# Patient Record
Sex: FEMALE | Race: WHITE | Employment: FULL TIME | URBAN - METROPOLITAN AREA
[De-identification: names, ages, dates, MRNs, and addresses within clinical notes are randomized per-mention and may not be internally consistent; named-entity substitution may affect disease eponyms.]

---

## 2017-04-06 PROBLEM — N83.202 CYST OF LEFT OVARY: Status: ACTIVE | Noted: 2017-04-06

## 2017-06-30 PROBLEM — G93.0 CHOROID PLEXUS CYST: Status: ACTIVE | Noted: 2017-06-30

## 2017-06-30 PROBLEM — O43.109 ABNORMAL PLACENTA: Status: ACTIVE | Noted: 2017-06-30

## 2017-09-21 PROBLEM — O36.60X0 LGA (LARGE FOR GESTATIONAL AGE) FETUS AFFECTING MANAGEMENT OF MOTHER: Status: ACTIVE | Noted: 2017-09-21

## 2017-09-25 PROBLEM — D50.9 IRON DEFICIENCY ANEMIA: Status: ACTIVE | Noted: 2017-09-25

## 2017-09-26 PROBLEM — R82.71 GBS BACTERIURIA: Status: ACTIVE | Noted: 2017-09-26

## 2017-10-06 PROBLEM — O40.9XX0 POLYHYDRAMNIOS: Status: ACTIVE | Noted: 2017-10-06

## 2017-10-11 PROBLEM — O24.410 WHITE CLASSIFICATION A1 GESTATIONAL DIABETES MELLITUS: Status: ACTIVE | Noted: 2017-10-11

## 2017-11-10 ENCOUNTER — HOSPITAL ENCOUNTER (INPATIENT)
Age: 22
LOS: 3 days | Discharge: HOME | End: 2017-11-14
Attending: OBSTETRICS & GYNECOLOGY | Admitting: OBSTETRICS & GYNECOLOGY
Payer: SELF-PAY

## 2017-11-10 LAB
ERYTHROCYTE [DISTWIDTH] IN BLOOD BY AUTOMATED COUNT: 13.6 % (ref 11.9–14.6)
GLUCOSE BLD STRIP.AUTO-MCNC: 86 MG/DL (ref 65–100)
HCT VFR BLD AUTO: 29.5 % (ref 35.8–46.3)
HGB BLD-MCNC: 9.7 G/DL (ref 11.7–15.4)
MCH RBC QN AUTO: 25.7 PG (ref 26.1–32.9)
MCHC RBC AUTO-ENTMCNC: 32.9 G/DL (ref 31.4–35)
MCV RBC AUTO: 78.2 FL (ref 79.6–97.8)
PLATELET # BLD AUTO: 226 K/UL (ref 150–450)
PMV BLD AUTO: 9.9 FL (ref 10.8–14.1)
RBC # BLD AUTO: 3.77 M/UL (ref 4.05–5.25)
WBC # BLD AUTO: 8.9 K/UL (ref 4.3–11.1)

## 2017-11-10 PROCEDURE — 74011250637 HC RX REV CODE- 250/637: Performed by: OBSTETRICS & GYNECOLOGY

## 2017-11-10 PROCEDURE — 82962 GLUCOSE BLOOD TEST: CPT

## 2017-11-10 PROCEDURE — 65270000029 HC RM PRIVATE

## 2017-11-10 PROCEDURE — 86900 BLOOD TYPING SEROLOGIC ABO: CPT | Performed by: OBSTETRICS & GYNECOLOGY

## 2017-11-10 PROCEDURE — 85027 COMPLETE CBC AUTOMATED: CPT | Performed by: OBSTETRICS & GYNECOLOGY

## 2017-11-10 RX ORDER — DEXTROSE, SODIUM CHLORIDE, SODIUM LACTATE, POTASSIUM CHLORIDE, AND CALCIUM CHLORIDE 5; .6; .31; .03; .02 G/100ML; G/100ML; G/100ML; G/100ML; G/100ML
250 INJECTION, SOLUTION INTRAVENOUS CONTINUOUS
Status: DISCONTINUED | OUTPATIENT
Start: 2017-11-11 | End: 2017-11-13

## 2017-11-10 RX ORDER — LIDOCAINE HYDROCHLORIDE 10 MG/ML
1 INJECTION INFILTRATION; PERINEURAL
Status: DISCONTINUED | OUTPATIENT
Start: 2017-11-10 | End: 2017-11-12 | Stop reason: HOSPADM

## 2017-11-10 RX ORDER — MINERAL OIL
120 OIL (ML) ORAL
Status: COMPLETED | OUTPATIENT
Start: 2017-11-10 | End: 2017-11-12

## 2017-11-10 RX ORDER — BUTORPHANOL TARTRATE 1 MG/ML
1 INJECTION INTRAMUSCULAR; INTRAVENOUS
Status: DISCONTINUED | OUTPATIENT
Start: 2017-11-10 | End: 2017-11-12 | Stop reason: HOSPADM

## 2017-11-10 RX ORDER — DEXTROSE MONOHYDRATE, SODIUM CHLORIDE, SODIUM LACTATE, POTASSIUM CHLORIDE, CALCIUM CHLORIDE 5; 600; 310; 179; 20 G/100ML; MG/100ML; MG/100ML; MG/100ML; MG/100ML
INJECTION, SOLUTION INTRAVENOUS CONTINUOUS
Status: DISCONTINUED | OUTPATIENT
Start: 2017-11-11 | End: 2017-11-10

## 2017-11-10 RX ORDER — ZOLPIDEM TARTRATE 5 MG/1
5 TABLET ORAL
Status: DISCONTINUED | OUTPATIENT
Start: 2017-11-10 | End: 2017-11-13

## 2017-11-10 RX ORDER — LIDOCAINE HYDROCHLORIDE 20 MG/ML
JELLY TOPICAL
Status: DISCONTINUED | OUTPATIENT
Start: 2017-11-10 | End: 2017-11-12 | Stop reason: HOSPADM

## 2017-11-10 RX ORDER — OXYTOCIN/RINGER'S LACTATE 30/500 ML
1-30 PLASTIC BAG, INJECTION (ML) INTRAVENOUS
Status: DISCONTINUED | OUTPATIENT
Start: 2017-11-11 | End: 2017-11-13

## 2017-11-10 RX ADMIN — ZOLPIDEM TARTRATE 5 MG: 5 TABLET ORAL at 23:25

## 2017-11-10 NOTE — IP AVS SNAPSHOT
Rubio Traylor 
 
 
 [address] [facility] [address] 
[phone] Patient: Nicolette Underwood MRN: [id number] :[id number] About your hospitalization You were admitted on:  November 10, 2017 You last received care in the:  [address] You were discharged on:  2017 Why you were hospitalized Your primary diagnosis was:  Not on File Your diagnoses also included:  Lga (Large For Gestational Age) Fetus Affecting Management Of Mother, Third Trimester, Fetus 1, Polyhydramnios Affecting Pregnancy In Third Trimester, Normal Labor Things You Need To Do (next 8 weeks) Follow up with Christos Singh MD  
  
Phone:  [phone] Where:  [address] [facility] [address] Follow up with Abena Daugherty MD in 2 week(s) As needed and/or, If symptoms worsen Phone:  [phone] Where:  [address] [facility] [address] Tuesday Dec 26, 2017 POSTPARTUM VISIT with Raina Raymond MD at  9:30 AM  
Where:  [facility] ([facility]) Discharge Orders None A check dontae indicates which time of day the medication should be taken. My Medications STOP taking these medications   
 penicillin v potassium 500 mg tablet Commonly known as:  VEETID  
   
  
  
TAKE these medications as instructed Instructions Each Dose to Equal  
 Morning Noon Evening Bedtime  
 ascorbic acid (vitamin C) 500 mg tablet Commonly known as:  VITAMIN C Your last dose was: Your next dose is: Take 1 Tab by mouth two (2) times a day. 500 mg  
    
   
   
   
  
 doxylamine succinate 25 mg tablet Commonly known as:  Eligha Bring Your last dose was: Your next dose is: Take 25 mg by mouth nightly as needed for Sleep. 25 mg  
    
   
   
   
  
 ferrous sulfate 325 mg (65 mg iron) tablet Commonly known as:  Iron (Ferrous Sulfate) Your last dose was: Your next dose is: Take 1 Tab by mouth two (2) times a day. 325 mg  
    
   
   
   
  
 ibuprofen 800 mg tablet Commonly known as:  MOTRIN Your last dose was: Your next dose is: Take 1 Tab by mouth every eight (8) hours as needed for Pain. 800 mg  
    
   
   
   
  
 oxyCODONE-acetaminophen 7.5-325 mg per tablet Commonly known as:  PERCOCET 7.5 Your last dose was: Your next dose is: Take  1 or 2 tablets every 4 hours as needed for pain. Do not exceed 8 tablets in 24 hours PNV 67-iron ps-folate no. 1-dha 29 mg iron- 1 mg-200 mg Cap Commonly known as:  Dirk Gills Your last dose was: Your next dose is: Take 1 Cap by mouth daily. 1 Cap VITAMIN B-6 100 mg tablet Generic drug:  pyridoxine (vitamin B6) Your last dose was: Your next dose is: Take 100 mg by mouth daily. 100 mg Where to Get Your Medications These medications were sent to [facility] [address], [address] [place] [address] 495, [address] [facility] [[email or web address] Phone:  [phone]  
  ibuprofen 800 mg tablet Information on where to get these meds will be given to you by the nurse or doctor. ! Ask your nurse or doctor about these medications  
  oxyCODONE-acetaminophen 7.5-325 mg per tablet Discharge Instructions Discharge instruction to follow: Activity: Pelvis rest for 6 weeks No heavy lifting over 15 lbs for 2 weeks No driving for 2 weeks No push/pull motion such as sweeping or vacuuming for 2 weeks No tub baths for 6 weeks  section keep incision clean and dry, may shower as normal with soap and water. Inspect incision every day for signs of infection listed below. Continue to use ranjeet-bottle with every void or bowel movement until comfortable stopping. Change sanitary pad after each urination or bowel movement. Call MD for the following: 
    Fever over 101 F; pain not relieved by medication; foul smelling vaginal discharge or increase in vaginal bleeding. Redness, swelling, or drainage from  incision. Take medication as prescribed. Follow up with MD as order. After Your Delivery (the Postpartum Period): Care Instructions Your Care Instructions Congratulations on the birth of your baby. Like pregnancy, the  period can be a time of excitement, terrie, and exhaustion. You may look at your wondrous little baby and feel happy. You may also be overwhelmed by your new sleep hours and new responsibilities. At first, babies often sleep during the days and are awake at night. They do not have a pattern or routine. They may make sudden gasps, jerk themselves awake, or look like they have crossed eyes. These are all normal, and they may even make you smile. In these first weeks after delivery, try to take good care of yourself. It may take 4 to 6 weeks to feel like yourself again, and possibly longer if you had a  birth. You will likely feel very tired for several weeks. Your days will be full of ups and downs, but lots of terrie as well. Follow-up care is a key part of your treatment and safety. Be sure to make and go to all appointments, and call your doctor if you are having problems. It's also a good idea to know your test results and keep a list of the medicines you take. How can you care for yourself at home? Take care of your body after delivery · Use pads instead of tampons for the bloody flow that may last as long as 2 weeks. · Ease cramps with ibuprofen (Advil, Motrin). · Ease soreness of hemorrhoids and the area between your vagina and rectum with ice compresses or witch hazel pads. · Ease constipation by drinking lots of fluid and eating high-fiber foods. Ask your doctor about over-the-counter stool softeners. · Cleanse yourself with a gentle squeeze of warm water from a bottle instead of wiping with toilet paper. · Take a sitz bath in warm water several times a day. · Wear a good nursing bra. Ease sore and swollen breasts with warm, wet washcloths. · If you are not breastfeeding, use ice rather than heat for breast soreness. · Your period may not start for several months if you are breastfeeding. You may bleed more, and longer at first, than you did before you got pregnant. · Wait until you are healed (about 4 to 6 weeks) before you have sexual intercourse. Your doctor will tell you when it is okay to have sex. · Try not to travel with your baby for 5 or 6 weeks. If you take a long car trip, make frequent stops to walk around and stretch. Avoid exhaustion · Rest every day. Try to nap when your baby naps. · Ask another adult to be with you for a few days after delivery. · Plan for  if you have other children. · Stay flexible so you can eat at odd hours and sleep when you need to. Both you and your baby are making new schedules. · Plan small trips to get out of the house. Change can make you feel less tired. · Ask for help with housework, cooking, and shopping. Remind yourself that your job is to care for your baby. Know about help for postpartum depression · \"Baby blues\" are common for the first 1 to 2 weeks after birth. You may cry or feel sad or irritable for no reason. · Rest whenever you can. Being tired makes it harder to handle your emotions. · Go for walks with your baby. · Talk to your partner, friends, and family about your feelings. · If your symptoms last for more than a few weeks, or if you feel very depressed, ask your doctor for help. · Postpartum depression can be treated. Support groups and counseling can help. Sometimes medicine can also help. Stay healthy · Eat healthy foods so you have more energy, make good breast milk, and lose extra baby pounds. · If you breastfeed, avoid alcohol and drugs. Stay smoke-free. If you quit during pregnancy, congratulations. · Start daily exercise after 4 to 6 weeks, but rest when you feel tired. · Learn exercises to tone your belly. Do Kegel exercises to regain strength in your pelvic muscles. You can do these exercises while you stand or sit. ¨ Squeeze the same muscles you would use to stop your urine. Your belly and thighs should not move. ¨ Hold the squeeze for 3 seconds, and then relax for 3 seconds. ¨ Start with 3 seconds. Then add 1 second each week until you are able to squeeze for 10 seconds. ¨ Repeat the exercise 10 to 15 times for each session. Do three or more sessions each day. · Find a class for new mothers and new babies that has an exercise time. · If you had a  birth, give yourself a bit more time before you exercise, and be careful. When should you call for help? Call 911 anytime you think you may need emergency care. For example, call if: 
? · You passed out (lost consciousness). ?Call your doctor now or seek immediate medical care if: 
? · You have severe vaginal bleeding. This means you are passing blood clots and soaking through a pad each hour for 2 or more hours. ? · You are dizzy or lightheaded, or you feel like you may faint. ? · You have a fever. ? · You have new belly pain, or your pain gets worse. ? Watch closely for changes in your health, and be sure to contact your doctor if: 
? · Your vaginal bleeding seems to be getting heavier. ? · You have new or worse vaginal discharge. ? · You feel sad, anxious, or hopeless for more than a few days. ? · You do not get better as expected. Where can you learn more? Go to http://david-angela.info/.  
Enter Z636 in the search box to learn more about \"After Your Delivery (the Postpartum Period): Care Instructions. \" Current as of: 2017 Content Version: 11.4 © 0221-5089 [employer]. Care instructions adapted under license by [employer] (which disclaims liability or warranty for this information). If you have questions about a medical condition or this instruction, always ask your healthcare professional. [address] any warranty or liability for your use of this information.  Section: What to Expect at [facility] Your Recovery A  section, or , is surgery to deliver your baby through a cut, called an incision, that the doctor makes in your lower belly and uterus. You may have some pain in your lower belly and need pain medicine for 1 to 2 weeks. You can expect some vaginal bleeding for several weeks. You will probably need about 6 weeks to fully recover. It is important to take it easy while the incision is healing. Avoid heavy lifting, strenuous activities, or exercises that strain the belly muscles while you are recovering. Ask a family member or friend for help with housework, cooking, and shopping. This care sheet gives you a general idea about how long it will take for you to recover. But each person recovers at a different pace. Follow the steps below to get better as quickly as possible. How can you care for yourself at home? Activity ? · Rest when you feel tired. Getting enough sleep will help you recover. ? · Try to walk each day. Start by walking a little more than you did the day before. Bit by bit, increase the amount you walk. Walking boosts blood flow and helps prevent pneumonia, constipation, and blood clots. ? · Avoid strenuous activities, such as bicycle riding, jogging, weightlifting, and aerobic exercise, for 6 weeks or until your doctor says it is okay. ? · Until your doctor says it is okay, do not lift anything heavier than your baby. ? · Do not do sit-ups or other exercises that strain the belly muscles for 6 weeks or until your doctor says it is okay. ? · Hold a pillow over your incision when you cough or take deep breaths. This will support your belly and decrease your pain. ? · You may shower as usual. Pat the incision dry when you are done. ? · You will have some vaginal bleeding. Wear sanitary pads. Do not douche or use tampons until your doctor says it is okay. ? · Ask your doctor when you can drive again. ? · You will probably need to take at least 6 weeks off work. It depends on the type of work you do and how you feel. ? · Ask your doctor when it is okay for you to have sex. Diet ? · You can eat your normal diet. If your stomach is upset, try bland, low-fat foods like plain rice, broiled chicken, toast, and yogurt. ? · Drink plenty of fluids (unless your doctor tells you not to). ? · You may notice that your bowel movements are not regular right after your surgery. This is common. Try to avoid constipation and straining with bowel movements. You may want to take a fiber supplement every day. If you have not had a bowel movement after a couple of days, ask your doctor about taking a mild laxative. ? · If you are breastfeeding, do not drink any alcohol. Medicines ? · Your doctor will tell you if and when you can restart your medicines. He or she will also give you instructions about taking any new medicines. ? · If you take blood thinners, such as warfarin (Coumadin), clopidogrel (Plavix), or aspirin, be sure to talk to your doctor. He or she will tell you if and when to start taking those medicines again. Make sure that you understand exactly what your doctor wants you to do. ? · Take pain medicines exactly as directed. ¨ If the doctor gave you a prescription medicine for pain, take it as prescribed.  
¨ If you are not taking a prescription pain medicine, ask your doctor if you can take an over-the-counter medicine. ? · If you think your pain medicine is making you sick to your stomach: 
¨ Take your medicine after meals (unless your doctor has told you not to). ¨ Ask your doctor for a different pain medicine. ? · If your doctor prescribed antibiotics, take them as directed. Do not stop taking them just because you feel better. You need to take the full course of antibiotics. Incision care ? · If you have strips of tape on the incision, leave the tape on for a week or until it falls off.  
? · Wash the area daily with warm, soapy water, and pat it dry. Don't use hydrogen peroxide or alcohol, which can slow healing. You may cover the area with a gauze bandage if it weeps or rubs against clothing. Change the bandage every day. ? · Keep the area clean and dry. Other instructions ? · If you breastfeed your baby, you may be more comfortable while you are healing if you place the baby so that he or she is not resting on your belly. Try tucking your baby under your arm, with his or her body along the side you will be feeding on. Support your baby's upper body with your arm. With that hand you can control your baby's head to bring his or her mouth to your breast. This is sometimes called the football hold. Follow-up care is a key part of your treatment and safety. Be sure to make and go to all appointments, and call your doctor if you are having problems. It's also a good idea to know your test results and keep a list of the medicines you take. When should you call for help? Call 911 anytime you think you may need emergency care. For example, call if: 
? · You passed out (lost consciousness). ? · You have chest pain, are short of breath, or cough up blood. ?Call your doctor now or seek immediate medical care if: 
? · You have pain that does not get better after you take pain medicine. ? · You have severe vaginal bleeding. ? · You are dizzy or lightheaded, or you feel like you may faint. ? · You have new or worse pain in your belly or pelvis. ? · You have loose stitches, or your incision comes open. ? · You have symptoms of infection, such as: 
¨ Increased pain, swelling, warmth, or redness. ¨ Red streaks leading from the incision. ¨ Pus draining from the incision. ¨ A fever. ? · You have symptoms of a blood clot in your leg (called a deep vein thrombosis), such as: 
¨ Pain in your calf, back of the knee, thigh, or groin. ¨ Redness and swelling in your leg or groin. ? Watch closely for changes in your health, and be sure to contact your doctor if: 
? · You do not get better as expected. Where can you learn more? Go to http://david-angela.info/. Enter M806 in the search box to learn more about \" Section: What to Expect at Home. \" Current as of: 2017 Content Version: 11.4 © 8276-3851 [employer]. Care instructions adapted under license by [employer] (which disclaims liability or warranty for this information). If you have questions about a medical condition or this instruction, always ask your healthcare professional. [address] any warranty or liability for your use of this information. Cuedd Announcement We are excited to announce that we are making your provider's discharge notes available to you in Cuedd. You will see these notes when they are completed and signed by the physician that discharged you from your recent hospital stay. If you have any questions or concerns about any information you see in Cuedd, please call the Health Information Department where you were seen or reach out to your Primary Care Provider for more information about your plan of care. Introducing hospitals & HEALTH SERVICES! Dear Jarrod Merritt: 
Thank you for requesting a Cuedd account.   Our records indicate that you already have an active Twist account. You can access your account anytime at [email or web address] Did you know that you can access your hospital and ER discharge instructions at any time in Twist? You can also review all of your test results from your hospital stay or ER visit. Additional Information If you have questions, please visit the Frequently Asked Questions section of the Twist website at [email or web address] Remember, Twist is NOT to be used for urgent needs. For medical emergencies, dial 911. Now available from your iPhone and Android! Providers Seen During Your Hospitalization Provider Specialty Primary office phone Reji Mcgowan MD Obstetrics & Gynecology [phone] Joeline Siemens, MD Obstetrics & Gynecology [phone] Your Primary Care Physician (PCP) Primary Care Physician Office Phone Office Fax Cher Conley [phone] You are allergic to the following Allergen Reactions Augmentin (Amoxicillin-Pot Clavulanate) Hives Pt has tolerated PCN G IV without reactions Cefzil (Cefprozil) Hives Recent Documentation Height Breastfeeding? OB Status Smoking Status 1.549 m Yes Recent pregnancy Never Smoker Emergency Contacts Name Discharge Info Relation Home Work Mobile Una Sadler  Mother [14] [phone] Brock Briggs  Spouse [3] [phone] Patient Belongings The following personal items are in your possession at time of discharge: 
  Dental Appliances: None  Visual Aid: Glasses      Home Medications: None   Jewelry: None  Clothing: At bedside    Other Valuables: Cell Phone, Ashley Ruiz Please provide this summary of care documentation to your next provider.  
  
  
 
  
Signatures-by signing, you are acknowledging that this After Visit Summary has been reviewed with you and you have received a copy. Patient Signature:  ____________________________________________________________ Date:  ____________________________________________________________  
  
Jean Claude Sport Provider Signature:  ____________________________________________________________ Date:  ____________________________________________________________

## 2017-11-11 ENCOUNTER — ANESTHESIA EVENT (OUTPATIENT)
Dept: LABOR AND DELIVERY | Age: 22
End: 2017-11-11
Payer: SELF-PAY

## 2017-11-11 ENCOUNTER — ANESTHESIA (OUTPATIENT)
Dept: LABOR AND DELIVERY | Age: 22
End: 2017-11-11
Payer: SELF-PAY

## 2017-11-11 PROBLEM — O40.3XX0 POLYHYDRAMNIOS AFFECTING PREGNANCY IN THIRD TRIMESTER: Status: ACTIVE | Noted: 2017-11-11

## 2017-11-11 PROBLEM — O36.63X1 LGA (LARGE FOR GESTATIONAL AGE) FETUS AFFECTING MANAGEMENT OF MOTHER, THIRD TRIMESTER, FETUS 1: Status: ACTIVE | Noted: 2017-11-11

## 2017-11-11 LAB
ABO + RH BLD: NORMAL
BLOOD GROUP ANTIBODIES SERPL: NORMAL
GLUCOSE BLD STRIP.AUTO-MCNC: 117 MG/DL (ref 65–100)
GLUCOSE BLD STRIP.AUTO-MCNC: 69 MG/DL (ref 65–100)
GLUCOSE BLD STRIP.AUTO-MCNC: 72 MG/DL (ref 65–100)
GLUCOSE BLD STRIP.AUTO-MCNC: 77 MG/DL (ref 65–100)
SPECIMEN EXP DATE BLD: NORMAL

## 2017-11-11 PROCEDURE — 82962 GLUCOSE BLOOD TEST: CPT

## 2017-11-11 PROCEDURE — 36415 COLL VENOUS BLD VENIPUNCTURE: CPT | Performed by: OBSTETRICS & GYNECOLOGY

## 2017-11-11 PROCEDURE — 74011250636 HC RX REV CODE- 250/636

## 2017-11-11 PROCEDURE — 74011258636 HC RX REV CODE- 258/636: Performed by: OBSTETRICS & GYNECOLOGY

## 2017-11-11 PROCEDURE — A4300 CATH IMPL VASC ACCESS PORTAL: HCPCS | Performed by: ANESTHESIOLOGY

## 2017-11-11 PROCEDURE — 74011000258 HC RX REV CODE- 258: Performed by: OBSTETRICS & GYNECOLOGY

## 2017-11-11 PROCEDURE — 74011000250 HC RX REV CODE- 250

## 2017-11-11 PROCEDURE — 3E033VJ INTRODUCTION OF OTHER HORMONE INTO PERIPHERAL VEIN, PERCUTANEOUS APPROACH: ICD-10-PCS | Performed by: OBSTETRICS & GYNECOLOGY

## 2017-11-11 PROCEDURE — 65270000029 HC RM PRIVATE

## 2017-11-11 PROCEDURE — 77030011943

## 2017-11-11 PROCEDURE — 4A1HX4Z MONITORING OF PRODUCTS OF CONCEPTION, CARDIAC ELECTRICAL ACTIVITY, EXTERNAL APPROACH: ICD-10-PCS | Performed by: OBSTETRICS & GYNECOLOGY

## 2017-11-11 PROCEDURE — 77030014125 HC TY EPDRL BBMI -B: Performed by: ANESTHESIOLOGY

## 2017-11-11 PROCEDURE — 74011250636 HC RX REV CODE- 250/636: Performed by: OBSTETRICS & GYNECOLOGY

## 2017-11-11 PROCEDURE — 74011250637 HC RX REV CODE- 250/637: Performed by: OBSTETRICS & GYNECOLOGY

## 2017-11-11 RX ORDER — ROPIVACAINE HYDROCHLORIDE 5 MG/ML
INJECTION, SOLUTION EPIDURAL; INFILTRATION; PERINEURAL AS NEEDED
Status: DISCONTINUED | OUTPATIENT
Start: 2017-11-11 | End: 2017-11-12 | Stop reason: HOSPADM

## 2017-11-11 RX ORDER — ROPIVACAINE HYDROCHLORIDE 2 MG/ML
INJECTION, SOLUTION EPIDURAL; INFILTRATION; PERINEURAL
Status: DISCONTINUED | OUTPATIENT
Start: 2017-11-11 | End: 2017-11-12 | Stop reason: HOSPADM

## 2017-11-11 RX ORDER — ACETAMINOPHEN 500 MG
1000 TABLET ORAL
Status: DISCONTINUED | OUTPATIENT
Start: 2017-11-11 | End: 2017-11-13

## 2017-11-11 RX ADMIN — SODIUM CHLORIDE, SODIUM LACTATE, POTASSIUM CHLORIDE, CALCIUM CHLORIDE, AND DEXTROSE MONOHYDRATE 125 ML/HR: 600; 310; 30; 20; 5 INJECTION, SOLUTION INTRAVENOUS at 13:44

## 2017-11-11 RX ADMIN — PENICILLIN G POTASSIUM 2.5 MILLION UNITS: 20000000 POWDER, FOR SOLUTION INTRAVENOUS at 15:00

## 2017-11-11 RX ADMIN — SODIUM CHLORIDE 5 MILLION UNITS: 900 INJECTION, SOLUTION INTRAVENOUS at 02:05

## 2017-11-11 RX ADMIN — SODIUM CHLORIDE, SODIUM LACTATE, POTASSIUM CHLORIDE, CALCIUM CHLORIDE, AND DEXTROSE MONOHYDRATE 125 ML/HR: 600; 310; 30; 20; 5 INJECTION, SOLUTION INTRAVENOUS at 07:00

## 2017-11-11 RX ADMIN — PENICILLIN G POTASSIUM 2.5 MILLION UNITS: 20000000 POWDER, FOR SOLUTION INTRAVENOUS at 18:30

## 2017-11-11 RX ADMIN — SODIUM CHLORIDE, SODIUM LACTATE, POTASSIUM CHLORIDE, CALCIUM CHLORIDE, AND DEXTROSE MONOHYDRATE 250 ML/HR: 600; 310; 30; 20; 5 INJECTION, SOLUTION INTRAVENOUS at 19:37

## 2017-11-11 RX ADMIN — PENICILLIN G POTASSIUM 2.5 MILLION UNITS: 20000000 POWDER, FOR SOLUTION INTRAVENOUS at 06:25

## 2017-11-11 RX ADMIN — PENICILLIN G POTASSIUM 2.5 MILLION UNITS: 20000000 POWDER, FOR SOLUTION INTRAVENOUS at 11:00

## 2017-11-11 RX ADMIN — ROPIVACAINE HYDROCHLORIDE: 2 INJECTION, SOLUTION EPIDURAL; INFILTRATION; PERINEURAL at 19:38

## 2017-11-11 RX ADMIN — PENICILLIN G POTASSIUM 2.5 MILLION UNITS: 20000000 POWDER, FOR SOLUTION INTRAVENOUS at 22:18

## 2017-11-11 RX ADMIN — OXYTOCIN 2 MILLI-UNITS/MIN: 10 INJECTION, SOLUTION INTRAMUSCULAR; INTRAVENOUS at 07:00

## 2017-11-11 RX ADMIN — ROPIVACAINE HYDROCHLORIDE 8 ML/HR: 2 INJECTION, SOLUTION EPIDURAL; INFILTRATION; PERINEURAL at 11:23

## 2017-11-11 RX ADMIN — ROPIVACAINE HYDROCHLORIDE 13 ML: 5 INJECTION, SOLUTION EPIDURAL; INFILTRATION; PERINEURAL at 11:26

## 2017-11-11 RX ADMIN — FENTANYL CITRATE 100 MCG: 50 INJECTION, SOLUTION INTRAMUSCULAR; INTRAVENOUS at 21:40

## 2017-11-11 RX ADMIN — ACETAMINOPHEN 1000 MG: 500 TABLET, FILM COATED ORAL at 18:05

## 2017-11-11 NOTE — PROGRESS NOTES
RN @ bedside. POC reviewed. Consents signed. IV started. Labs sent. TOCO and EFM applied. Pt. Denies concerns @ this time.

## 2017-11-11 NOTE — PROGRESS NOTES
Dr Sharmila Mejia on phone and updated on pt status. SVE 3/80/-2 mid vertex. Telephone orders received to hold cervidil. EFM may be taken of once a reactive strip is obtained. Start PCN @ 0200 per GBS+ protocol. Start Pitocin @ 0700.

## 2017-11-11 NOTE — PROGRESS NOTES
Raman Nuñez at bedside at 1113. GLORIA Yao at bedside at 1113    Assisted pt to sitting up on bedside at 111. Timeout completed at 200 with MD, GLORIA and myself at bedside. Dose given at 1120. Pt assisted to lying back in left tilt position. See anesthesia record for details. See vital sign flow sheet for BP. Tolerated procedure well.

## 2017-11-11 NOTE — PROGRESS NOTES
Dr. Isma Naik @ nurse station. Verbal orders obtained for cervidil ripening tonight. Start PCN for GBS + protocol @ 0200 per Dr. Isma Naik. Note in pt chart to order PCN despite allergy of hives to Augmentin and Cefzil. Orders to obtain initial blood sugar, fasting blood sugar and every 4 hours during labor. Will admit to room 428, start IV, send labs and begin admission process.

## 2017-11-11 NOTE — ANESTHESIA PREPROCEDURE EVALUATION
Anesthetic History   No history of anesthetic complications            Review of Systems / Medical History  Patient summary reviewed and pertinent labs reviewed    Pulmonary  Within defined limits                 Neuro/Psych   Within defined limits           Cardiovascular  Within defined limits                Exercise tolerance: >4 METS     GI/Hepatic/Renal  Within defined limits              Endo/Other    Diabetes (gest)    Anemia     Other Findings            Physical Exam    Airway  Mallampati: II  TM Distance: 4 - 6 cm  Neck ROM: normal range of motion   Mouth opening: Normal     Cardiovascular  Regular rate and rhythm,  S1 and S2 normal,  no murmur, click, rub, or gallop  Rhythm: regular  Rate: normal         Dental  No notable dental hx       Pulmonary  Breath sounds clear to auscultation               Abdominal  GI exam deferred       Other Findings            Anesthetic Plan    ASA: 2  Anesthesia type: epidural          Induction: Intravenous  Anesthetic plan and risks discussed with: Patient      FTP; anterior lip; for CS

## 2017-11-11 NOTE — PROGRESS NOTES
11/10/17 2330   Maternal Vital Signs   Temp 98.2 °F (36.8 °C)   Temp Source Oral   Pulse (Heart Rate) 98   Resp Rate 16   Level of Consciousness Alert   /84   MAP (Calculated) 100   BP 1 Method Automatic   BP 1 Location Left arm   BP Patient Position At rest   MEWS Score 1     VSS on room air. EFM removed by prior nurse. All needs met at this time. Admission assessment completed by JAY Bar RN and reviewed at this time. Patient instructed to call for nurse if contractions become more painful and closer together, or LOF. Patient verbalized understanding. BS 86. Patient educated that she can eat until 0000 then ice chips.

## 2017-11-11 NOTE — PROGRESS NOTES
Pt assessment completed this morning. SVE 3/90/-3. Discussed with pt plan of care for the day, pain management options, and her expectations of delivery/birth plan. Pt plans on breastfeeding infant and we discussed her skin to skin post delivery for one hour. Pt has verbalized understanding of care.

## 2017-11-11 NOTE — PROGRESS NOTES
Shift assessment complete. See flowsheet for details. Pt encouraged to call out PRN. Pt is now quietly resting in bed with family at bedside.

## 2017-11-11 NOTE — PROGRESS NOTES
Dr. Marianne Carter has come and assessed pt and her labor progress. SVE 6/100/-1. Dr. Marianne Carter has ordered to increase pitocin to 30 mu.  Also she has been notified that pt's last blood sugar was 69 and changed D5LR to 250 cc/hr

## 2017-11-11 NOTE — ANESTHESIA PROCEDURE NOTES
Epidural Block    Start time: 11/11/2017 11:15 AM  End time: 11/11/2017 11:26 AM  Performed by: Rigoberto Ruggiero  Authorized by: Rigoberto Ruggiero     Pre-Procedure  Indication: at surgeon's request and labor epidural    Preanesthetic Checklist: patient identified, risks and benefits discussed, anesthesia consent, site marked, patient being monitored, timeout performed and anesthesia consent    Timeout Time: 11:15        Epidural:   Patient position:  Seated  Prep region:  Lumbar  Prep: Chlorhexidine    Location:  L3-4    Needle and Epidural Catheter:   Needle Type:  Tuohy  Needle Gauge:  17 G  Injection Technique:  Loss of resistance using air  Attempts:  1  Catheter Size:  19 G  Depth in Epidural Space (cm):  5  Events: no blood with aspiration, no cerebrospinal fluid with aspiration, no paresthesia and negative aspiration test    Test Dose:  Negative    Assessment:   Catheter Secured:  Tape and tegaderm  Insertion:  Uncomplicated  Patient tolerance:  Patient tolerated the procedure well with no immediate complications

## 2017-11-11 NOTE — PROGRESS NOTES
Nutrition: Best Practice Alert received for GDM. Patient is NPO in preparation for delivery. Will defer assessment and follow up per standard of care.  Jose Luis Woodward, [address], [address] [place] [address]

## 2017-11-11 NOTE — PROGRESS NOTES
Antibiotic given as ordered for GBS prophylaxis. Common side effects including stomach upset, diarrhea, flushing, HA, and rash/itching discussed. Patient denied allergy to PCN. Patient instructed to report burning, redness, swelling, or pain at IV site.      Antibiotic will be administered every 4 until delivery of infant. Goal is two administrations before delivery. GBS can be found in the intestinal tract and/or vagina of healthy individuals. GBS is not a sexually transmitted disease. Pregnant women who carry GBS can pass the bacteria on to their infant and cause infection.     Patient instructed to report signs or symptoms of candida vulvovaginitis, including; vulvar pruritis, burning, soreness, and irritation to Obstetrician postpartum.  Patient verbalized understanding.

## 2017-11-11 NOTE — PROGRESS NOTES
SBAR report at bedside from [address] [place] [address], care assumed. POC discussed, patient will come off of EFM monitoring at this time. Antibiotics will be started at 0200 for GBS positive. Pitocin will be started at 0700.

## 2017-11-11 NOTE — PROGRESS NOTES
7/C/-1,   Chart reviewed. Patient Vitals for the past 12 hrs:   Temp Pulse Resp BP   11/11/17 1817 - (!) 109 - 134/62   11/11/17 1816 - (!) 106 - (!) 150/93   11/11/17 1800 (P) 99.8 °F (37.7 °C) (!) 102 (P) 18 127/84   11/11/17 1746 - (!) 108 - 121/82   11/11/17 1602 - 91 - 108/68   11/11/17 1548 - (!) 103 - 118/67   11/11/17 1533 - 93 - 123/87   11/11/17 1518 - 84 - 120/79   11/11/17 1500 - 88 - 119/78   11/11/17 1446 - 89 - 117/81   11/11/17 1431 - 88 - 119/76   11/11/17 1416 - 79 - 114/68   11/11/17 1402 - 83 - 123/76   11/11/17 1400 98.2 °F (36.8 °C) - - -   11/11/17 1346 - 85 - 119/75   11/11/17 1333 - 88 - 114/73   11/11/17 1317 - 92 - 138/86   11/11/17 1301 - (!) 116 - 126/89   11/11/17 1247 - 93 - 122/82   11/11/17 1231 - (!) 115 - 132/77   11/11/17 1217 98 °F (36.7 °C) 93 - 119/76   11/11/17 1202 - (!) 102 - 122/77   11/11/17 1157 - (!) 107 - 123/78   11/11/17 1153 - 88 - 122/74   11/11/17 1151 - 97 - 129/74   11/11/17 1149 - (!) 104 - 142/85   11/11/17 1143 - 97 - 123/77   11/11/17 1137 - 88 - 122/77   11/11/17 1132 - 80 - 128/83   11/11/17 1130 - (!) 107 - 123/79   11/11/17 1129 - 90 - 122/82   11/11/17 1127 - 86 - 129/85   11/11/17 1126 - 88 - -   11/11/17 1125 - 95 - 129/88   11/11/17 1124 - 93 - 126/82   11/11/17 1123 - 86 - 130/77   11/11/17 1121 - 93 - (!) 139/93   11/11/17 1113 - 95 - 130/87   11/11/17 1045 - 98 - 128/84   11/11/17 1042 - 89 - (!) 132/91   11/11/17 1014 - (!) 102 - 118/77   11/11/17 0941 - 92 - 126/84   11/11/17 0912 - (!) 106 - (!) 114/91 11/11/17 0842 - 91 - 123/84   11/11/17 0814 - (!) 110 - 125/81   11/11/17 0800 98.2 °F (36.8 °C) - - -   11/11/17 0746 - 90 - 130/76   11/11/17 0711 - 93 - 133/88     Cervix:  7/C/-1,   FHTs:   Baseline 150s w/ accels. Regular contractions  Comfortable w/ TYREE. Continue pitocin with  close surveillance, currently @ 24 mu/min. Pt has been on the peanut several times.    T 99.8, tylenol given, pt has been on pcn for + GBS, add gent if temp spikes.      1320   4/C/-2     (me)  1430   5/C/ -1    (RN)  1600   5-6/C/-1  (RN)  1830   6-7          (RN)  1845   7/C/-1      (me)

## 2017-11-11 NOTE — PROGRESS NOTES
POC glucose 72    Returned to [facility]    PCN dose hung.  Awaiting reactive strip to begin pitocin

## 2017-11-11 NOTE — PROGRESS NOTES
Dr. Mervat Johnson has been here to assess pt and she has reviewed fetal strip. Fetal strip has been reactive. SVE and AROM was completed at 1315 with clear fluid.

## 2017-11-11 NOTE — PROGRESS NOTES
Chart reviewed. Patient Vitals for the past 12 hrs:   Temp Pulse BP   17 1333 - 88 114/73   17 1317 - 92 138/86   17 1301 - (!) 116 126/89   17 1247 - 93 122/82   17 1231 - (!) 115 132/77   17 1217 98 °F (36.7 °C) 93 119/76   17 1202 - (!) 102 122/77   17 1157 - (!) 107 123/78   17 1153 - 88 122/74   17 1151 - 97 129/74   17 1149 - (!) 104 142/85   17 1143 - 97 123/77   17 1137 - 88 122/77   17 1132 - 80 128/83   17 1130 - (!) 107 123/79   17 1129 - 90 122/82   17 1127 - 86 129/85   17 1126 - 88 -   17 1125 - 95 129/88   17 1124 - 93 126/82   17 1123 - 86 130/77   17 1121 - 93 (!) 139/93   17 1113 - 95 130/87   17 1045 - 98 128/84   17 1042 - 89 (!) 132/91   17 1014 - (!) 102 118/77   17 0941 - 92 126/84   17 0912 - (!) 106 (!) 114/91   17 0842 - 91 123/84   17 0814 - (!) 110 125/81   17 0800 98.2 °F (36.8 °C) - -   17 0746 - 90 130/76   17 0711 - 93 133/88   17 0622 - (!) 106 (!) 124/91       Cervix:   4/C/-2,   Membranes:  AROM, clear  FHTs:   Baseline 130s w/ accels. Regular contractions  Comfortable w/ TYREE. Continue close surveillance  Anticipates a boy, \"[place]\"  discussed possibility for dystocia given EFW & borderline DM. Discussed the inability to predict which baby will have dystocia and that if it occurs it could result in fetal morbidity including brachial plexus injury with loss of function in the affected limb. Option for  discussed. Patient reports Dr. Flavia Valencia had this discussion with them in the office. EFW 8- 9+ lb. States she understands the risks & wants to try for a vaginal birth.       Continue IOL

## 2017-11-11 NOTE — PROGRESS NOTES
Dr. Sampson Duane at [facility] and report was given that pt's temp has increased to 99.8. Fetal strip has been reactive and pitocin just turned to 24 mu. Pt had her bladder emptied and SVE done at 1740. See Flowsheet. Orders received for tylenol.

## 2017-11-12 LAB
BASE DEFICIT BLDCOA-SCNC: 1.2 MMOL/L (ref 0–2)
BASE DEFICIT BLDCOV-SCNC: 1.6 MMOL/L (ref 1.9–7.7)
BDY SITE: ABNORMAL
BDY SITE: ABNORMAL
GLUCOSE BLD STRIP.AUTO-MCNC: 102 MG/DL (ref 65–100)
HCO3 BLDCOA-SCNC: 26 MMOL/L (ref 22–26)
HCO3 BLDV-SCNC: 23 MMOL/L
PCO2 BLDCOA: 55 MMHG (ref 33–49)
PCO2 BLDCOV: 37 MMHG (ref 14.1–43.3)
PH BLDCOA: 7.29 [PH] (ref 7.21–7.31)
PH BLDCOV: 7.4 [PH] (ref 7.2–7.44)
PO2 BLDCOA: <10 MMHG (ref 9–19)
PO2 BLDV: 30 MMHG (ref 30.4–57.2)
SERVICE CMNT-IMP: ABNORMAL
SERVICE CMNT-IMP: ABNORMAL

## 2017-11-12 PROCEDURE — 82803 BLOOD GASES ANY COMBINATION: CPT

## 2017-11-12 PROCEDURE — 74011250637 HC RX REV CODE- 250/637: Performed by: OBSTETRICS & GYNECOLOGY

## 2017-11-12 PROCEDURE — 82962 GLUCOSE BLOOD TEST: CPT

## 2017-11-12 PROCEDURE — 74011250636 HC RX REV CODE- 250/636: Performed by: OBSTETRICS & GYNECOLOGY

## 2017-11-12 PROCEDURE — 77030011943

## 2017-11-12 PROCEDURE — 77030018836 HC SOL IRR NACL ICUM -A: Performed by: OBSTETRICS & GYNECOLOGY

## 2017-11-12 PROCEDURE — 74011000250 HC RX REV CODE- 250: Performed by: ANESTHESIOLOGY

## 2017-11-12 PROCEDURE — 65270000029 HC RM PRIVATE

## 2017-11-12 PROCEDURE — 75410000002 HC LABOR FEE PER 1 HR

## 2017-11-12 PROCEDURE — 74011250636 HC RX REV CODE- 250/636

## 2017-11-12 PROCEDURE — 76060000078 HC EPIDURAL ANESTHESIA

## 2017-11-12 PROCEDURE — 77030005518 HC CATH URETH FOL 2W BARD -B

## 2017-11-12 PROCEDURE — 74011250636 HC RX REV CODE- 250/636: Performed by: ANESTHESIOLOGY

## 2017-11-12 PROCEDURE — 77030005518 HC CATH URETH FOL 2W BARD -B: Performed by: OBSTETRICS & GYNECOLOGY

## 2017-11-12 PROCEDURE — 76010000391 HC C SECN FIRST 1 HR: Performed by: OBSTETRICS & GYNECOLOGY

## 2017-11-12 PROCEDURE — 77030011640 HC PAD GRND REM COVD -A: Performed by: OBSTETRICS & GYNECOLOGY

## 2017-11-12 PROCEDURE — 77030032490 HC SLV COMPR SCD KNE COVD -B

## 2017-11-12 PROCEDURE — 77030002974 HC SUT PLN J&J -A: Performed by: OBSTETRICS & GYNECOLOGY

## 2017-11-12 PROCEDURE — 76010000392 HC C SECN EA ADDL 0.5 HR: Performed by: OBSTETRICS & GYNECOLOGY

## 2017-11-12 PROCEDURE — 77030002933 HC SUT MCRYL J&J -A: Performed by: OBSTETRICS & GYNECOLOGY

## 2017-11-12 PROCEDURE — 75410000003 HC RECOV DEL/VAG/CSECN EA 0.5 HR: Performed by: OBSTETRICS & GYNECOLOGY

## 2017-11-12 PROCEDURE — 77030032490 HC SLV COMPR SCD KNE COVD -B: Performed by: OBSTETRICS & GYNECOLOGY

## 2017-11-12 PROCEDURE — 76060000078 HC EPIDURAL ANESTHESIA: Performed by: OBSTETRICS & GYNECOLOGY

## 2017-11-12 PROCEDURE — 77030018846 HC SOL IRR STRL H20 ICUM -A: Performed by: OBSTETRICS & GYNECOLOGY

## 2017-11-12 PROCEDURE — 77030002888 HC SUT CHRMC J&J -A: Performed by: OBSTETRICS & GYNECOLOGY

## 2017-11-12 RX ORDER — SODIUM CHLORIDE, SODIUM LACTATE, POTASSIUM CHLORIDE, CALCIUM CHLORIDE 600; 310; 30; 20 MG/100ML; MG/100ML; MG/100ML; MG/100ML
1000 INJECTION, SOLUTION INTRAVENOUS CONTINUOUS
Status: DISCONTINUED | OUTPATIENT
Start: 2017-11-12 | End: 2017-11-13

## 2017-11-12 RX ORDER — ONDANSETRON 2 MG/ML
4 INJECTION INTRAMUSCULAR; INTRAVENOUS
Status: DISCONTINUED | OUTPATIENT
Start: 2017-11-12 | End: 2017-11-13 | Stop reason: SDUPTHER

## 2017-11-12 RX ORDER — SODIUM CHLORIDE, SODIUM LACTATE, POTASSIUM CHLORIDE, CALCIUM CHLORIDE 600; 310; 30; 20 MG/100ML; MG/100ML; MG/100ML; MG/100ML
INJECTION, SOLUTION INTRAVENOUS
Status: DISCONTINUED | OUTPATIENT
Start: 2017-11-12 | End: 2017-11-12 | Stop reason: HOSPADM

## 2017-11-12 RX ORDER — KETAMINE HYDROCHLORIDE 50 MG/ML
INJECTION, SOLUTION INTRAMUSCULAR; INTRAVENOUS AS NEEDED
Status: DISCONTINUED | OUTPATIENT
Start: 2017-11-12 | End: 2017-11-12 | Stop reason: HOSPADM

## 2017-11-12 RX ORDER — GENTAMICIN SULFATE 60 MG/50ML
120 INJECTION, SOLUTION INTRAVENOUS ONCE
Status: COMPLETED | OUTPATIENT
Start: 2017-11-12 | End: 2017-11-12

## 2017-11-12 RX ORDER — MORPHINE SULFATE 0.5 MG/ML
INJECTION, SOLUTION EPIDURAL; INTRATHECAL; INTRAVENOUS AS NEEDED
Status: DISCONTINUED | OUTPATIENT
Start: 2017-11-12 | End: 2017-11-12 | Stop reason: HOSPADM

## 2017-11-12 RX ORDER — LIDOCAINE HYDROCHLORIDE AND EPINEPHRINE 20; 5 MG/ML; UG/ML
INJECTION, SOLUTION EPIDURAL; INFILTRATION; INTRACAUDAL; PERINEURAL AS NEEDED
Status: DISCONTINUED | OUTPATIENT
Start: 2017-11-12 | End: 2017-11-12 | Stop reason: HOSPADM

## 2017-11-12 RX ORDER — FENTANYL CITRATE 50 UG/ML
INJECTION, SOLUTION INTRAMUSCULAR; INTRAVENOUS AS NEEDED
Status: DISCONTINUED | OUTPATIENT
Start: 2017-11-11 | End: 2017-11-12 | Stop reason: HOSPADM

## 2017-11-12 RX ORDER — KETOROLAC TROMETHAMINE 30 MG/ML
INJECTION, SOLUTION INTRAMUSCULAR; INTRAVENOUS AS NEEDED
Status: DISCONTINUED | OUTPATIENT
Start: 2017-11-12 | End: 2017-11-12 | Stop reason: HOSPADM

## 2017-11-12 RX ORDER — CLINDAMYCIN PHOSPHATE 900 MG/50ML
900 INJECTION, SOLUTION INTRAVENOUS ONCE
Status: DISCONTINUED | OUTPATIENT
Start: 2017-11-12 | End: 2017-11-12 | Stop reason: SDUPTHER

## 2017-11-12 RX ORDER — OXYTOCIN/RINGER'S LACTATE 30/500 ML
PLASTIC BAG, INJECTION (ML) INTRAVENOUS
Status: DISCONTINUED | OUTPATIENT
Start: 2017-11-12 | End: 2017-11-12 | Stop reason: HOSPADM

## 2017-11-12 RX ORDER — ONDANSETRON 2 MG/ML
INJECTION INTRAMUSCULAR; INTRAVENOUS AS NEEDED
Status: DISCONTINUED | OUTPATIENT
Start: 2017-11-12 | End: 2017-11-12 | Stop reason: HOSPADM

## 2017-11-12 RX ORDER — CLINDAMYCIN PHOSPHATE 900 MG/50ML
900 INJECTION INTRAVENOUS ONCE
Status: COMPLETED | OUTPATIENT
Start: 2017-11-12 | End: 2017-11-12

## 2017-11-12 RX ORDER — KETOROLAC TROMETHAMINE 30 MG/ML
30 INJECTION, SOLUTION INTRAMUSCULAR; INTRAVENOUS EVERY 6 HOURS
Status: DISCONTINUED | OUTPATIENT
Start: 2017-11-12 | End: 2017-11-13

## 2017-11-12 RX ORDER — NALOXONE HYDROCHLORIDE 0.4 MG/ML
0.2 INJECTION, SOLUTION INTRAMUSCULAR; INTRAVENOUS; SUBCUTANEOUS
Status: DISCONTINUED | OUTPATIENT
Start: 2017-11-12 | End: 2017-11-13

## 2017-11-12 RX ORDER — DIPHENHYDRAMINE HYDROCHLORIDE 50 MG/ML
12.5 INJECTION, SOLUTION INTRAMUSCULAR; INTRAVENOUS
Status: DISCONTINUED | OUTPATIENT
Start: 2017-11-12 | End: 2017-11-13 | Stop reason: SDUPTHER

## 2017-11-12 RX ORDER — ACETAMINOPHEN 325 MG/1
650 TABLET ORAL
Status: DISCONTINUED | OUTPATIENT
Start: 2017-11-12 | End: 2017-11-13

## 2017-11-12 RX ORDER — HYDROMORPHONE HYDROCHLORIDE 1 MG/ML
1 INJECTION, SOLUTION INTRAMUSCULAR; INTRAVENOUS; SUBCUTANEOUS
Status: DISCONTINUED | OUTPATIENT
Start: 2017-11-12 | End: 2017-11-13 | Stop reason: SDUPTHER

## 2017-11-12 RX ORDER — KETOROLAC TROMETHAMINE 30 MG/ML
30 INJECTION, SOLUTION INTRAMUSCULAR; INTRAVENOUS
Status: DISCONTINUED | OUTPATIENT
Start: 2017-11-12 | End: 2017-11-12 | Stop reason: SDUPTHER

## 2017-11-12 RX ORDER — OXYCODONE HYDROCHLORIDE 5 MG/1
10 TABLET ORAL
Status: DISCONTINUED | OUTPATIENT
Start: 2017-11-12 | End: 2017-11-13 | Stop reason: SDUPTHER

## 2017-11-12 RX ORDER — NALOXONE HYDROCHLORIDE 0.4 MG/ML
0.4 INJECTION, SOLUTION INTRAMUSCULAR; INTRAVENOUS; SUBCUTANEOUS
Status: DISCONTINUED | OUTPATIENT
Start: 2017-11-12 | End: 2017-11-14 | Stop reason: HOSPADM

## 2017-11-12 RX ADMIN — SODIUM CHLORIDE, SODIUM LACTATE, POTASSIUM CHLORIDE, CALCIUM CHLORIDE: 600; 310; 30; 20 INJECTION, SOLUTION INTRAVENOUS at 04:32

## 2017-11-12 RX ADMIN — LIDOCAINE HYDROCHLORIDE AND EPINEPHRINE 5 ML: 20; 5 INJECTION, SOLUTION EPIDURAL; INFILTRATION; INTRACAUDAL; PERINEURAL at 03:57

## 2017-11-12 RX ADMIN — LIDOCAINE HYDROCHLORIDE AND EPINEPHRINE 5 ML: 20; 5 INJECTION, SOLUTION EPIDURAL; INFILTRATION; INTRACAUDAL; PERINEURAL at 03:50

## 2017-11-12 RX ADMIN — CLINDAMYCIN PHOSPHATE 900 MG: 18 INJECTION, SOLUTION INTRAMUSCULAR; INTRAVENOUS at 03:54

## 2017-11-12 RX ADMIN — ONDANSETRON 4 MG: 2 INJECTION INTRAMUSCULAR; INTRAVENOUS at 04:31

## 2017-11-12 RX ADMIN — KETOROLAC TROMETHAMINE 30 MG: 30 INJECTION, SOLUTION INTRAMUSCULAR; INTRAVENOUS at 05:15

## 2017-11-12 RX ADMIN — GENTAMICIN SULFATE 120 MG: 60 INJECTION, SOLUTION INTRAVENOUS at 04:01

## 2017-11-12 RX ADMIN — SODIUM CHLORIDE, SODIUM LACTATE, POTASSIUM CHLORIDE, AND CALCIUM CHLORIDE 1000 ML: 600; 310; 30; 20 INJECTION, SOLUTION INTRAVENOUS at 10:20

## 2017-11-12 RX ADMIN — LIDOCAINE HYDROCHLORIDE AND EPINEPHRINE 5 ML: 20; 5 INJECTION, SOLUTION EPIDURAL; INFILTRATION; INTRACAUDAL; PERINEURAL at 04:38

## 2017-11-12 RX ADMIN — MINERAL OIL 120 ML: 471.95 OIL ORAL at 03:44

## 2017-11-12 RX ADMIN — FENTANYL CITRATE 100 MCG: 50 INJECTION, SOLUTION INTRAMUSCULAR; INTRAVENOUS at 00:48

## 2017-11-12 RX ADMIN — LIDOCAINE HYDROCHLORIDE AND EPINEPHRINE 5 ML: 20; 5 INJECTION, SOLUTION EPIDURAL; INFILTRATION; INTRACAUDAL; PERINEURAL at 04:43

## 2017-11-12 RX ADMIN — KETAMINE HYDROCHLORIDE 50 MG: 50 INJECTION, SOLUTION INTRAMUSCULAR; INTRAVENOUS at 04:44

## 2017-11-12 RX ADMIN — Medication 250 ML/HR: at 04:31

## 2017-11-12 RX ADMIN — PENICILLIN G POTASSIUM 2.5 MILLION UNITS: 20000000 POWDER, FOR SOLUTION INTRAVENOUS at 02:03

## 2017-11-12 RX ADMIN — FENTANYL CITRATE 100 MCG: 50 INJECTION, SOLUTION INTRAMUSCULAR; INTRAVENOUS at 04:40

## 2017-11-12 RX ADMIN — MORPHINE SULFATE 4 MG: 0.5 INJECTION, SOLUTION EPIDURAL; INTRATHECAL; INTRAVENOUS at 04:36

## 2017-11-12 RX ADMIN — FENTANYL CITRATE 100 MCG: 50 INJECTION, SOLUTION INTRAMUSCULAR; INTRAVENOUS at 04:36

## 2017-11-12 RX ADMIN — SODIUM CHLORIDE 12.5 MG: 9 INJECTION INTRAMUSCULAR; INTRAVENOUS; SUBCUTANEOUS at 07:51

## 2017-11-12 RX ADMIN — LIDOCAINE HYDROCHLORIDE AND EPINEPHRINE 5 ML: 20; 5 INJECTION, SOLUTION EPIDURAL; INFILTRATION; INTRACAUDAL; PERINEURAL at 04:24

## 2017-11-12 RX ADMIN — KETOROLAC TROMETHAMINE 30 MG: 30 INJECTION, SOLUTION INTRAMUSCULAR at 19:03

## 2017-11-12 RX ADMIN — KETOROLAC TROMETHAMINE 30 MG: 30 INJECTION, SOLUTION INTRAMUSCULAR at 13:21

## 2017-11-12 RX ADMIN — LIDOCAINE HYDROCHLORIDE AND EPINEPHRINE 5 ML: 20; 5 INJECTION, SOLUTION EPIDURAL; INFILTRATION; INTRACAUDAL; PERINEURAL at 03:45

## 2017-11-12 RX ADMIN — SODIUM CHLORIDE, SODIUM LACTATE, POTASSIUM CHLORIDE, CALCIUM CHLORIDE: 600; 310; 30; 20 INJECTION, SOLUTION INTRAVENOUS at 04:01

## 2017-11-12 RX ADMIN — GENTAMICIN SULFATE 120 MG: 60 INJECTION, SOLUTION INTRAVENOUS at 04:08

## 2017-11-12 NOTE — ROUTINE PROCESS
SBAR IN Report: Mother    Verbal report received from [facility], [address] (full name & credentials) on this patient, who is now being transferred from L & D (unit) for routine progression of care. The patient is wearing a green \"Anesthesia-Duramorph\" band. Report consisted of patient's Situation, Background, Assessment and Recommendations (SBAR). Max ID bands were compared with the identification form, and verified with the patient and transferring nurse. Information from the SBAR and the [place] Report was reviewed with the transferring nurse; opportunity for questions and clarification provided.

## 2017-11-12 NOTE — L&D DELIVERY NOTE
Delivery Summary    Patient: Christina Poole MRN: [id number]  SSN: [id number]    YOB: 1995  Age: 25 y.o. Sex: female        Information for the patient's :  Seven Walker [[id number]]       Labor Events:    Labor: No   Rupture Date: 2017   Rupture Time: 1:15 PM   Rupture Type AROM   Amniotic Fluid Volume: Polyhydramic    Amniotic Fluid Description: Clear     Induction: Oxytocin       Augmentation: None   Labor Events: Failure to Progress in First Stage     Cervical Ripening:     Misoprostol     Delivery Events:  Episiotomy: None   Laceration(s): None     Repaired: None    Number of Repair Packets: 0   Suture Type and Size: None     Estimated Blood Loss (ml): 1,000.00ml       Delivery Date: 2017    Delivery Time: 4:30 AM  Delivery Type: , Low Transverse   Details    Trial of Labor: Yes   Primary/Repeat: Primary   Priority: Routine   Indications:  Failure to Progress;Macrosomia; Cephalopelvic Disproportion; Other (Add Comments)   Incision type:     Sex:  Male     Gestational Age: 38w11d  Delivery Clinician:  Herber Farnsworth  Living Status: Living   Delivery Location: OR OR1          APGARS  One minute Five minutes Ten minutes   Skin color: 1   1        Heart rate: 2   2        Grimace: 2   2        Muscle tone: 2   2        Breathin   2        Totals: 9   9          Presentation: Vertex    Position: Middle Occiput Anterior  Resuscitation Method:  Suctioning-bulb; Tactile Stimulation     Meconium Stained: None      Cord Vessels: 3 Vessels      Cord Events:    Cord Blood Sent?:  Yes    Blood Gases Sent?:  Yes    Placenta:  Date/Time:   4:31 AM  Removal: Manual Removal      Appearance: Normal;Adherent      Measurements:  Birth Weight: 9 lb 3.4 oz (4.18 kg)      Birth Length: 55 cm      Head Circumference: 36.5 cm      Chest Circumference: 36 cm     Abdominal Girth:       Other Providers:   Mulugeta Zeng KATIE;ENRRIQUE ISMAEL BETTENCOURT;ERIC THOMAS;WHITNEY GAUTAM;STEFANI HUNT;Akhil MANNING Rosan Hane, Obstetrician;Primary Nurse;Nursery Nurse;Scrub Tech;Respiratory Therapist;Neonatologist;Obstetrician;Crna; Anesthesiologist             Group B Strep:   Lab Results   Component Value Date/Time    GrBStrep, External Positive in urine 10/20/2017     Information for the patient's :  Melody Callaway [[id number]]   No results found for: Elrosalia Frankel, PCTDIG, BILI, ABORHEXT, [address]    Lab Results   Component Value Date/Time    APH 7.295 2017 04:30 AM    APCO2 55 (H) 2017 04:30 AM    APO2 <10 2017 04:30 AM    AHCO3 26 2017 04:30 AM    ABDC 1.2 2017 04:30 AM    EPHV 7.403 2017 04:30 AM    PCO2V 37 2017 04:30 AM    PO2V 30 (L) 2017 04:30 AM    HCO3V 23 2017 04:30 AM    EBDV 1.6 (L) 2017 04:30 AM    SITE CORD 2017 04:30 AM    SITE CORD 2017 04:30 AM    RSCOM n a at 2017 4 45 26 AM. Not read back. 2017 04:30 AM    RSCOM n a at 2017 4 48 51 AM. Not read back.  2017 04:30 AM

## 2017-11-12 NOTE — OP NOTES
Delivery Procedure Note   Selene Smith  [id number]  1995    Pre-operative Diagnosis: Primary  C section secondary to arrest of dilation and descent  Post op Dx : TERESITA   Operation: Procedure(s):  Primary LTCS   Surgeon: Jarrell Jones MD  Assistant: Dr. Goran Babb   Anesthesia:  TYREE, Anesthesiologist:  Dr. George Stallings  Findings:  Baby \" Samantha Valencia", 9 lb 3 oz, A  , bilateral FT/ovaries/uterus wnl  Specimen: none   EBL: 800 cc   Procedure Detail:  The patient was taken to the operating room, where her TYREE was re-dosed. The patient was prepped and draped in the usual sterile fashion. Pfannenstiel skin incision was made sharply and carried down to the fascia using sharp & bovie dissection. The fascial incision was extended bilaterally with Mars scissors. The fascia was dissected superiorly & inferiorly off the rectus muscles using blunt & bovie dissection. The rectus muscles were bluntly  in the midline. The peritoneum was identified, tented and entered sharply under direct visualization and extended superiorly and inferiorly with Metzenbaum scissors. The bladder blade was inserted. The vesicouterine peritoneum was tented, incised & extended bilaterally sharply. The bladder flap was then created using blunt & sharp dissection and the bladder blade reinserted. A low transverse hysterotomy was created sharply in a semicurvilinear fashion. Amniotomy revealed light meconium stained fluid. Incision was extended via gentle traction in a cephalad & caudad direction. The surgeons's hand was placed within the pelvis, the babys head was floating deeply wedged in the pelvis. It  was elevated  and delivery accomplished via gentle traction atraumatically. The nose and mouth were bulb suctioned on the operative field. The cord was doubly clamped and cut, cord gas & blood obtained. The baby was handed off to the waiting special care nursery staff.   Placenta was then expressed via uterine massage with gentle traction on the cord. The uterus was exteriorized, wrapped in a wet lap & dry curetted. The uterine incision was closed in two layers using 0 chromic in a continuous running/locking fashion. The second layer was imbricated using 0 Monocryl with good hemostasis noted. The posterior cul de sac was irrigated with warm normal saline. Good hemostasis was again confirmed. The bladder flap was then reapproximated using 3-0 Monocryl in a continuous running fashion. The uterus was returned to its normal anatomic position. The paracolic gutters were irrigated with warm normal saline and  good hemostasis was again noted throughout. The peritoneum was closed using 2-0 Monocryl in a continuous running fashion. The fascia was closed using 0 vicryl in a continuous running fashion. Good hemostasis was confirmed. The subcutaneous tissue was reapproximated using 2-0 plain gut in a continuous running fashion. The skin was closed with a 4-0 Vicryl in a subcuticular fashion. The patient tolerated the procedure well. Sponge, lap, and needle counts were correct times two and the patient and her  were taken to recovery in stable condition. Urine was clear throughout the case.

## 2017-11-12 NOTE — LACTATION NOTE
This note was copied from a baby's chart. Instructed pt on curve tip syringe, educated on positioning finger to get infant sucking, positioning cts and slowly pushing while infant sucks, verbalized understanding. Infant took 12 mL colostrum without issue. Mom burped infant. 2 mL on reserve for next feed. Instructed to try on their own to feed infant at next feed and to call if needs assistance, verbalized understanding.

## 2017-11-12 NOTE — PROGRESS NOTES
Shift assessment complete, see flowsheet for complete assessment. +FM. POC discussed with patient and significant other, verbalize understanding and agreement. Deny any questions, needs, or concerns at this time. Patient resting in bed with significant other at side. Call light within reach. Encouraged to call prn-verbalizes understanding.

## 2017-11-12 NOTE — PROGRESS NOTES
Report given to BOOM Alvarado RN by [employer] and care relinquished.

## 2017-11-12 NOTE — PROGRESS NOTES
Dr. Anaid Martinez at bedside, strip reviewed by MD. MD discussed POC with patient and significant other, verbalize understanding and agreement. See MD note. [id number] Patient and significant other agree to proceed with  section,  section called.

## 2017-11-12 NOTE — PROGRESS NOTES
7-8/C/-1 to 0, FHTs 150s w/ accels. Concerns re dystocia given slow progress d/w pt and her . They want to continue w/ labor.

## 2017-11-12 NOTE — PROGRESS NOTES
delivery of viable male infant, apgars 9/9. Infant to NICU team at [facility] for assessment. Initial infant assessment performed by Dr. Rosa Christopher, neonatologist, see MD note.

## 2017-11-12 NOTE — PROGRESS NOTES
SBAR OUT Report: Mother    Verbal report given to Brit Vegas RN on this patient, who is now being transferred to MIU for routine progression of care. The patient is wearing a green \"Anesthesia-Duramorph\" band. Report consisted of patient's Situation, Background, Assessment and Recommendations (SBAR). [place] ID bands were compared with the identification form, and verified with the patient and receiving nurse. Information from the SBAR, OR Summary, Procedure Summary, Intake/Output and MAR and the [place] Report was reviewed with the receiving nurse; opportunity for questions and clarification provided.

## 2017-11-12 NOTE — ANESTHESIA POSTPROCEDURE EVALUATION
Post-Anesthesia Evaluation and Assessment    Patient: Anam Horta MRN: [id number]  SSN: [id number]    YOB: 1995  Age: 25 y.o. Sex: female       Cardiovascular Function/Vital Signs  Visit Vitals    /80 (BP 1 Location: Right arm, BP Patient Position: At rest)    Pulse (!) 113    Temp 36.8 °C (98.3 °F)    Resp 16    Ht 5' 1\" (1.549 m)    SpO2 97%    Breastfeeding Yes       Patient is status post epidural anesthesia for Procedure(s):   SECTION. Nausea/Vomiting: Significant    Postoperative hydration reviewed and adequate. Pain:  Pain Scale 1: Numeric (0 - 10) (17)  Pain Intensity 1: 0 (17)   Managed    Neurological Status:   Neuro  Neurologic State: Alert (17)  Orientation Level: Oriented X4 (17)  Cognition: Appropriate decision making (17)  Speech: Clear (17)  LUE Motor Response: Purposeful (17)  LLE Motor Response: Numbness (Post anesthesia) (17)  RUE Motor Response: Purposeful (17)  RLE Motor Response: Numbness (Post anesthesia) (17)   Block resolving    Mental Status and Level of Consciousness: Alert and oriented     Pulmonary Status:   O2 Device: Room air (17)   Adequate oxygenation and airway patent    Complications related to anesthesia: None    Post-anesthesia assessment completed. Ordered phenergan, RN just gave when I arrived. Pt may have repeat dose.       Signed By: Marina Katz MD     2017

## 2017-11-12 NOTE — LACTATION NOTE
This note was copied from a baby's chart. RN notified lactation that baby with low blood glucose at last check. Needs to feed now. Mom sleeping. Woke mom and explained infant with low blood glucose (39) and need to attempt to feed now. Baby sleeping. Roused infant and suck assessed. Baby with good suck but very sleepy. Attempted on both breasts in football hold. Colostrum easily hand expressed. Baby would latch for 1-2 sucks only, very sleepy still. Discussed need to hand express and pump since infant not latching well and low blood glucose, mom agreeable. When returned to room with pump baby more alert, assisted on each breast again, latched again on both sides but only slightly more vigorous, only 5-7 minutes total nursing. Proceeded with pumping. Pumped x 10 minutes only as mom falling asleep. 6.5ml colostrum obtained. Lactation consultant fed to baby via straight syringe, tolerated well, took all colostrum given. Sleeping soundly in cradle now. Mom encouraged to rest now. Limited teaching done since mom so sleepy. Baby only 8 hours old at present. LGA. RN updated.  Will follow feeds closely and continue to assist.

## 2017-11-12 NOTE — PROGRESS NOTES
Dr. Renee Minors at bedside, strip and vital signs reviewed by MD. MD updated on patient status and FHTs per strip. SVE 7-8/100 per MD, see MD note.

## 2017-11-12 NOTE — LACTATION NOTE

## 2017-11-12 NOTE — PROGRESS NOTES
Anterior lip/C/+1 to + 2 with molding, FHTs 150s w/ accels. No cervical change for ~ 3+ hours. Secondary arrest of dilation d/w pt and her . Need for  discussed. Pt understands that complications aren't anticipated however if complications occur they could necessitate:   transfusion, GREGG, ureteral stint, prolonged sanchez drainage, colostomy &/or other procedures as indicated. Pt understands/consents. Pt reports hives during childhood w/ Augmentin and Cefzil h/w she tolerates PCN. Has been receiving PCN for GBS prophylaxis w/o problem. Pt has felt warm on exam for hours, txd w/po tylenol will add Maritza Ash for suspected chorio. Pre op abx 1 dose:  Clinda and Gent.

## 2017-11-12 NOTE — PROGRESS NOTES
IV hepwelled, SCDs removed, [place] catheter emptied and removed, pt tolerated well. Assisted pt to bathroom, instructed on ranjeet bottle, pad and panties changed, gown changed, linens changed. Pt able to void a small amount. Instructed on brp and to call out if dizzy or needs assistance, verbalized understanding.

## 2017-11-13 PROBLEM — Z37.9 NORMAL LABOR: Status: ACTIVE | Noted: 2017-11-13

## 2017-11-13 LAB
ERYTHROCYTE [DISTWIDTH] IN BLOOD BY AUTOMATED COUNT: 14.2 % (ref 11.9–14.6)
HCT VFR BLD AUTO: 24.5 % (ref 35.8–46.3)
HGB BLD-MCNC: 7.8 G/DL (ref 11.7–15.4)
MCH RBC QN AUTO: 25.4 PG (ref 26.1–32.9)
MCHC RBC AUTO-ENTMCNC: 31.8 G/DL (ref 31.4–35)
MCV RBC AUTO: 79.8 FL (ref 79.6–97.8)
PLATELET # BLD AUTO: 198 K/UL (ref 150–450)
PMV BLD AUTO: 9.4 FL (ref 10.8–14.1)
RBC # BLD AUTO: 3.07 M/UL (ref 4.05–5.25)
WBC # BLD AUTO: 9.7 K/UL (ref 4.3–11.1)

## 2017-11-13 PROCEDURE — 85027 COMPLETE CBC AUTOMATED: CPT | Performed by: OBSTETRICS & GYNECOLOGY

## 2017-11-13 PROCEDURE — 74011250637 HC RX REV CODE- 250/637: Performed by: OBSTETRICS & GYNECOLOGY

## 2017-11-13 PROCEDURE — 74011250636 HC RX REV CODE- 250/636: Performed by: OBSTETRICS & GYNECOLOGY

## 2017-11-13 PROCEDURE — 36415 COLL VENOUS BLD VENIPUNCTURE: CPT | Performed by: OBSTETRICS & GYNECOLOGY

## 2017-11-13 PROCEDURE — 65270000029 HC RM PRIVATE

## 2017-11-13 RX ORDER — DEXTROSE, SODIUM CHLORIDE, SODIUM LACTATE, POTASSIUM CHLORIDE, AND CALCIUM CHLORIDE 5; .6; .31; .03; .02 G/100ML; G/100ML; G/100ML; G/100ML; G/100ML
125 INJECTION, SOLUTION INTRAVENOUS CONTINUOUS
Status: DISCONTINUED | OUTPATIENT
Start: 2017-11-13 | End: 2017-11-14 | Stop reason: HOSPADM

## 2017-11-13 RX ORDER — DEXTROSE, SODIUM CHLORIDE, SODIUM LACTATE, POTASSIUM CHLORIDE, AND CALCIUM CHLORIDE 5; .6; .31; .03; .02 G/100ML; G/100ML; G/100ML; G/100ML; G/100ML
125 INJECTION, SOLUTION INTRAVENOUS CONTINUOUS
Status: DISCONTINUED | OUTPATIENT
Start: 2017-11-13 | End: 2017-11-13 | Stop reason: SDUPTHER

## 2017-11-13 RX ORDER — OXYTOCIN/0.9 % SODIUM CHLORIDE 15/250 ML
250 PLASTIC BAG, INJECTION (ML) INTRAVENOUS ONCE
Status: ACTIVE | OUTPATIENT
Start: 2017-11-13 | End: 2017-11-13

## 2017-11-13 RX ORDER — SODIUM CHLORIDE 0.9 % (FLUSH) 0.9 %
5-10 SYRINGE (ML) INJECTION EVERY 8 HOURS
Status: DISCONTINUED | OUTPATIENT
Start: 2017-11-13 | End: 2017-11-13 | Stop reason: SDUPTHER

## 2017-11-13 RX ORDER — IBUPROFEN 800 MG/1
800 TABLET ORAL 3 TIMES DAILY
Status: DISCONTINUED | OUTPATIENT
Start: 2017-11-13 | End: 2017-11-14 | Stop reason: HOSPADM

## 2017-11-13 RX ORDER — SODIUM CHLORIDE 0.9 % (FLUSH) 0.9 %
5-10 SYRINGE (ML) INJECTION AS NEEDED
Status: DISCONTINUED | OUTPATIENT
Start: 2017-11-13 | End: 2017-11-14 | Stop reason: HOSPADM

## 2017-11-13 RX ORDER — OXYTOCIN/RINGER'S LACTATE 15/250 ML
250 PLASTIC BAG, INJECTION (ML) INTRAVENOUS ONCE
Status: DISCONTINUED | OUTPATIENT
Start: 2017-11-13 | End: 2017-11-13 | Stop reason: SDUPTHER

## 2017-11-13 RX ORDER — SODIUM CHLORIDE 0.9 % (FLUSH) 0.9 %
5-10 SYRINGE (ML) INJECTION AS NEEDED
Status: DISCONTINUED | OUTPATIENT
Start: 2017-11-13 | End: 2017-11-13 | Stop reason: SDUPTHER

## 2017-11-13 RX ORDER — SODIUM CHLORIDE 0.9 % (FLUSH) 0.9 %
5-10 SYRINGE (ML) INJECTION EVERY 8 HOURS
Status: DISCONTINUED | OUTPATIENT
Start: 2017-11-13 | End: 2017-11-13

## 2017-11-13 RX ORDER — HYDROMORPHONE HYDROCHLORIDE 1 MG/ML
1 INJECTION, SOLUTION INTRAMUSCULAR; INTRAVENOUS; SUBCUTANEOUS
Status: DISCONTINUED | OUTPATIENT
Start: 2017-11-13 | End: 2017-11-14 | Stop reason: HOSPADM

## 2017-11-13 RX ORDER — IBUPROFEN 800 MG/1
800 TABLET ORAL 3 TIMES DAILY
Status: DISCONTINUED | OUTPATIENT
Start: 2017-11-13 | End: 2017-11-13

## 2017-11-13 RX ORDER — OXYCODONE AND ACETAMINOPHEN 7.5; 325 MG/1; MG/1
1 TABLET ORAL
Status: DISCONTINUED | OUTPATIENT
Start: 2017-11-13 | End: 2017-11-14 | Stop reason: HOSPADM

## 2017-11-13 RX ORDER — OXYCODONE AND ACETAMINOPHEN 7.5; 325 MG/1; MG/1
2 TABLET ORAL
Status: DISCONTINUED | OUTPATIENT
Start: 2017-11-13 | End: 2017-11-14 | Stop reason: HOSPADM

## 2017-11-13 RX ORDER — SIMETHICONE 80 MG
80 TABLET,CHEWABLE ORAL
Status: DISCONTINUED | OUTPATIENT
Start: 2017-11-13 | End: 2017-11-14 | Stop reason: HOSPADM

## 2017-11-13 RX ORDER — DIPHENHYDRAMINE HCL 25 MG
25 CAPSULE ORAL
Status: DISCONTINUED | OUTPATIENT
Start: 2017-11-13 | End: 2017-11-14 | Stop reason: HOSPADM

## 2017-11-13 RX ORDER — ONDANSETRON 2 MG/ML
4 INJECTION INTRAMUSCULAR; INTRAVENOUS
Status: DISCONTINUED | OUTPATIENT
Start: 2017-11-13 | End: 2017-11-14 | Stop reason: HOSPADM

## 2017-11-13 RX ORDER — DOCUSATE SODIUM 100 MG/1
100 CAPSULE, LIQUID FILLED ORAL 2 TIMES DAILY
Status: DISCONTINUED | OUTPATIENT
Start: 2017-11-13 | End: 2017-11-14 | Stop reason: HOSPADM

## 2017-11-13 RX ADMIN — SIMETHICONE CHEW TAB 80 MG 80 MG: 80 TABLET ORAL at 11:39

## 2017-11-13 RX ADMIN — DOCUSATE SODIUM 100 MG: 100 CAPSULE, LIQUID FILLED ORAL at 08:11

## 2017-11-13 RX ADMIN — OXYCODONE HYDROCHLORIDE AND ACETAMINOPHEN 1 TABLET: 7.5; 325 TABLET ORAL at 20:01

## 2017-11-13 RX ADMIN — IBUPROFEN 800 MG: 800 TABLET, FILM COATED ORAL at 07:17

## 2017-11-13 RX ADMIN — SIMETHICONE CHEW TAB 80 MG 80 MG: 80 TABLET ORAL at 08:11

## 2017-11-13 RX ADMIN — SIMETHICONE CHEW TAB 80 MG 80 MG: 80 TABLET ORAL at 16:30

## 2017-11-13 RX ADMIN — IBUPROFEN 800 MG: 800 TABLET, FILM COATED ORAL at 11:39

## 2017-11-13 RX ADMIN — KETOROLAC TROMETHAMINE 30 MG: 30 INJECTION, SOLUTION INTRAMUSCULAR at 01:03

## 2017-11-13 RX ADMIN — IBUPROFEN 800 MG: 800 TABLET, FILM COATED ORAL at 20:00

## 2017-11-13 RX ADMIN — DOCUSATE SODIUM 100 MG: 100 CAPSULE, LIQUID FILLED ORAL at 16:54

## 2017-11-13 NOTE — LACTATION NOTE
In to follow up with mom and infant. Mom stated that infant has been latching and nursing well. She did pump once during the night but said it was because infant was sleepy and didn't nurse. She expressed 4 ml. Offered to assist with a feeding but she said that she did not need any help. Reviewed second night of life. Lactation consultant will follow up tomorrow.

## 2017-11-13 NOTE — PROGRESS NOTES
Chart reviewed due to first time parent - no needs identified.  met with family and provided education on [facility] [place] [facility] Visit [facility].  Family declined referral for home visit.     Brijesh Erickson, [address] [facility]

## 2017-11-13 NOTE — PROGRESS NOTES
Post-Operative Day Number 1 Progress Note    Patient doing well post-op day 1 from  delivery without significant complaints. Pain controlled on current medication. Voiding without difficulty, normal lochia. Vitals:  Patient Vitals for the past 8 hrs:   BP Temp Pulse Resp   17 0823 113/68 98.1 °F (36.7 °C) 87 18   17 0430 110/69 98 °F (36.7 °C) 89 18     Temp (24hrs), Av.3 °F (36.8 °C), Min:98 °F (36.7 °C), Max:98.7 °F (37.1 °C)      Vital signs stable, afebrile. Exam:  Patient without distress. Abdomen soft, fundus firm at level of umbilicus, non tender. Incision dry and clean without erythema. Lower extremities are negative for swelling, cords or tenderness. Lab/Data Review:  CBC:   Lab Results   Component Value Date/Time    WBC 9.7 2017 06:02 AM    HGB 7.8 (L) 2017 06:02 AM    HCT 24.5 (L) 2017 06:02 AM     2017 06:02 AM       Assessment and Plan:  Patient appears to be having uncomplicated post- course. Continue routine post-op care and maternal education. Hgb has dropped to 7.8. Denies orthostatic sx, will repeat h&h in the morning.

## 2017-11-13 NOTE — PROGRESS NOTES
Anesthesiology  Post-op Note    Post-op day s/p  via epidural with neuraxial opioids for post-op pain management. Visit Vitals    /60 (BP 1 Location: Right arm, BP Patient Position: Sitting)    Pulse 89    Temp 36.8 °C (98.3 °F)    Resp 18    Ht 5' 1\" (1.549 m)    LMP 2017    SpO2 97%    Breastfeeding Yes   Airway patent. Patient appropriately hydrated and appears euvolemic. Patient is Alert and oriented. Pain is well controlled. No complaints about back or site of injection. Back exam is clear with no signs of infection or erythema. Motor and sensory function has returned to baseline in lower extremities. Patient has voided on her own. Patient is satisfied with anesthetic and reports no complications. Continue current orders, then initiate surgeon's orders for pain management 24 hours after . Follow up per surgeon.     Carisa Catalan MD

## 2017-11-14 VITALS
HEIGHT: 61 IN | OXYGEN SATURATION: 97 % | SYSTOLIC BLOOD PRESSURE: 128 MMHG | DIASTOLIC BLOOD PRESSURE: 89 MMHG | TEMPERATURE: 98 F | HEART RATE: 94 BPM | RESPIRATION RATE: 16 BRPM

## 2017-11-14 LAB
HCT VFR BLD AUTO: 23.4 % (ref 35.8–46.3)
HGB BLD-MCNC: 7.4 G/DL (ref 11.7–15.4)

## 2017-11-14 PROCEDURE — 74011250637 HC RX REV CODE- 250/637: Performed by: OBSTETRICS & GYNECOLOGY

## 2017-11-14 PROCEDURE — 85018 HEMOGLOBIN: CPT | Performed by: OBSTETRICS & GYNECOLOGY

## 2017-11-14 PROCEDURE — 36415 COLL VENOUS BLD VENIPUNCTURE: CPT | Performed by: OBSTETRICS & GYNECOLOGY

## 2017-11-14 RX ORDER — IBUPROFEN 800 MG/1
800 TABLET ORAL
Qty: 60 TAB | Refills: 0 | Status: SHIPPED | OUTPATIENT
Start: 2017-11-14 | End: 2017-11-27 | Stop reason: ALTCHOICE

## 2017-11-14 RX ORDER — OXYCODONE AND ACETAMINOPHEN 7.5; 325 MG/1; MG/1
TABLET ORAL
Qty: 30 TAB | Refills: 0 | Status: SHIPPED | OUTPATIENT
Start: 2017-11-14 | End: 2017-11-27 | Stop reason: ALTCHOICE

## 2017-11-14 RX ADMIN — IBUPROFEN 800 MG: 800 TABLET, FILM COATED ORAL at 13:37

## 2017-11-14 RX ADMIN — IBUPROFEN 800 MG: 800 TABLET, FILM COATED ORAL at 08:08

## 2017-11-14 RX ADMIN — DOCUSATE SODIUM 100 MG: 100 CAPSULE, LIQUID FILLED ORAL at 08:08

## 2017-11-14 RX ADMIN — SIMETHICONE CHEW TAB 80 MG 80 MG: 80 TABLET ORAL at 08:08

## 2017-11-14 RX ADMIN — SIMETHICONE CHEW TAB 80 MG 80 MG: 80 TABLET ORAL at 13:37

## 2017-11-14 NOTE — PROGRESS NOTES
Shift assessment completed, pain medication requested and received Motrin 800mg PO, Percocet 7.5 mg PO. Encouraged to call out for any needs.

## 2017-11-14 NOTE — LACTATION NOTE
Called to room to assist with latch again. Infant would just cry at the breast. Mom would hand express and we attempted football and cross cradle hold. Instructed mom to pump and feed expressed colostrum to infant. Reviewed discharge information with mom and dad. Left room to type a feeding plan and mom had infant latched on her left breast in cross cradle hold when I entered room. Infant was sucking rhythmically. Nursed for several minutes. I could hear \"gulps\" when infant swallowed. Reviewed feeding plan with mom and dad. Encouraged mom to follow up with our outpatient lactation consultant but she declined. Stated that she will follow up as needed.

## 2017-11-14 NOTE — PROGRESS NOTES
Patient discharged to home after ID bands verified and 's code alert removed. Discharge teaching complete, patient verbalizes understanding; questions encouraged. Prescription given. Stable at discharge. Instructed patient to call out for assistance when infant is in car seat and they are ready to leave unit, patient verbalized understanding.

## 2017-11-14 NOTE — LACTATION NOTE
In to follow up with mom and infant. Mom stated that infant will not latch and nurse anymore. She has been pumping and feeding infant with curve tip syringe. Assisted mom with attempt to latch infant on left breast in cross cradle and then in football hold. Infant would latch but would not suck. Mom stated that pacifier was introduced. Informed mom that is the possible reason that infant has stopped latching and sucking. Reviewed with mom reason to refrain from using a pacifier at this time. Was getting ready to attempt latch on right breast and nurse came for infant to get a circumcision. Encouraged  mom to pump and feed infant expressed colostrum. Instructed her to call out for next feeding attempt. Lactation consultant will follow up with mom and infant tomorrow otherwise.

## 2017-11-14 NOTE — DISCHARGE INSTRUCTIONS
Discharge instruction to follow: Activity: Pelvis rest for 6 weeks     No heavy lifting over 15 lbs for 2 weeks     No driving for 2 weeks     No push/pull motion such as sweeping or vacuuming for 2 weeks     No tub baths for 6 weeks     section keep incision clean and dry, may shower as normal with soap and water. Inspect incision every day for signs of infection listed below. Continue to use ranjeet-bottle with every void or bowel movement until comfortable stopping. Change sanitary pad after each urination or bowel movement. Call MD for the following:      Fever over 101 F; pain not relieved by medication; foul smelling vaginal discharge or increase in vaginal bleeding. Redness, swelling, or drainage from  incision. Take medication as prescribed. Follow up with MD as order. After Your Delivery (the Postpartum Period): Care Instructions  Your Care Instructions    Congratulations on the birth of your baby. Like pregnancy, the  period can be a time of excitement, terrie, and exhaustion. You may look at your wondrous little baby and feel happy. You may also be overwhelmed by your new sleep hours and new responsibilities. At first, babies often sleep during the days and are awake at night. They do not have a pattern or routine. They may make sudden gasps, jerk themselves awake, or look like they have crossed eyes. These are all normal, and they may even make you smile. In these first weeks after delivery, try to take good care of yourself. It may take 4 to 6 weeks to feel like yourself again, and possibly longer if you had a  birth. You will likely feel very tired for several weeks. Your days will be full of ups and downs, but lots of terrie as well. Follow-up care is a key part of your treatment and safety. Be sure to make and go to all appointments, and call your doctor if you are having problems.  It's also a good idea to know your test results and keep a list of the medicines you take. How can you care for yourself at home? Take care of your body after delivery  · Use pads instead of tampons for the bloody flow that may last as long as 2 weeks. · Ease cramps with ibuprofen (Advil, Motrin). · Ease soreness of hemorrhoids and the area between your vagina and rectum with ice compresses or witch hazel pads. · Ease constipation by drinking lots of fluid and eating high-fiber foods. Ask your doctor about over-the-counter stool softeners. · Cleanse yourself with a gentle squeeze of warm water from a bottle instead of wiping with toilet paper. · Take a sitz bath in warm water several times a day. · Wear a good nursing bra. Ease sore and swollen breasts with warm, wet washcloths. · If you are not breastfeeding, use ice rather than heat for breast soreness. · Your period may not start for several months if you are breastfeeding. You may bleed more, and longer at first, than you did before you got pregnant. · Wait until you are healed (about 4 to 6 weeks) before you have sexual intercourse. Your doctor will tell you when it is okay to have sex. · Try not to travel with your baby for 5 or 6 weeks. If you take a long car trip, make frequent stops to walk around and stretch. Avoid exhaustion  · Rest every day. Try to nap when your baby naps. · Ask another adult to be with you for a few days after delivery. · Plan for  if you have other children. · Stay flexible so you can eat at odd hours and sleep when you need to. Both you and your baby are making new schedules. · Plan small trips to get out of the house. Change can make you feel less tired. · Ask for help with housework, cooking, and shopping. Remind yourself that your job is to care for your baby. Know about help for postpartum depression  · \"Baby blues\" are common for the first 1 to 2 weeks after birth. You may cry or feel sad or irritable for no reason. · Rest whenever you can.  Being tired makes it harder to handle your emotions. · Go for walks with your baby. · Talk to your partner, friends, and family about your feelings. · If your symptoms last for more than a few weeks, or if you feel very depressed, ask your doctor for help. · Postpartum depression can be treated. Support groups and counseling can help. Sometimes medicine can also help. Stay healthy  · Eat healthy foods so you have more energy, make good breast milk, and lose extra baby pounds. · If you breastfeed, avoid alcohol and drugs. Stay smoke-free. If you quit during pregnancy, congratulations. · Start daily exercise after 4 to 6 weeks, but rest when you feel tired. · Learn exercises to tone your belly. Do Kegel exercises to regain strength in your pelvic muscles. You can do these exercises while you stand or sit. ¨ Squeeze the same muscles you would use to stop your urine. Your belly and thighs should not move. ¨ Hold the squeeze for 3 seconds, and then relax for 3 seconds. ¨ Start with 3 seconds. Then add 1 second each week until you are able to squeeze for 10 seconds. ¨ Repeat the exercise 10 to 15 times for each session. Do three or more sessions each day. · Find a class for new mothers and new babies that has an exercise time. · If you had a  birth, give yourself a bit more time before you exercise, and be careful. When should you call for help? Call 911 anytime you think you may need emergency care. For example, call if:  ? · You passed out (lost consciousness). ?Call your doctor now or seek immediate medical care if:  ? · You have severe vaginal bleeding. This means you are passing blood clots and soaking through a pad each hour for 2 or more hours. ? · You are dizzy or lightheaded, or you feel like you may faint. ? · You have a fever. ? · You have new belly pain, or your pain gets worse. ? Watch closely for changes in your health, and be sure to contact your doctor if:  ? · Your vaginal bleeding seems to be getting heavier. ? · You have new or worse vaginal discharge. ? · You feel sad, anxious, or hopeless for more than a few days. ? · You do not get better as expected. Where can you learn more? Go to http://david-angela.info/. Enter A461 in the search box to learn more about \"After Your Delivery (the Postpartum Period): Care Instructions. \"  Current as of: 2017  Content Version: 11.4  © 3772-8989 [employer]. Care instructions adapted under license by [employer] (which disclaims liability or warranty for this information). If you have questions about a medical condition or this instruction, always ask your healthcare professional. [place] [address] any warranty or liability for your use of this information.  Section: What to Expect at [address] [place] [address]    A  section, or , is surgery to deliver your baby through a cut, called an incision, that the doctor makes in your lower belly and uterus. You may have some pain in your lower belly and need pain medicine for 1 to 2 weeks. You can expect some vaginal bleeding for several weeks. You will probably need about 6 weeks to fully recover. It is important to take it easy while the incision is healing. Avoid heavy lifting, strenuous activities, or exercises that strain the belly muscles while you are recovering. Ask a family member or friend for help with housework, cooking, and shopping. This care sheet gives you a general idea about how long it will take for you to recover. But each person recovers at a different pace. Follow the steps below to get better as quickly as possible. How can you care for yourself at home? Activity  ? · Rest when you feel tired. Getting enough sleep will help you recover. ? · Try to walk each day. Start by walking a little more than you did the day before. Bit by bit, increase the amount you walk.  Walking boosts blood flow and helps prevent pneumonia, constipation, and blood clots. ? · Avoid strenuous activities, such as bicycle riding, jogging, weightlifting, and aerobic exercise, for 6 weeks or until your doctor says it is okay. ? · Until your doctor says it is okay, do not lift anything heavier than your baby. ? · Do not do sit-ups or other exercises that strain the belly muscles for 6 weeks or until your doctor says it is okay. ? · Hold a pillow over your incision when you cough or take deep breaths. This will support your belly and decrease your pain. ? · You may shower as usual. Pat the incision dry when you are done. ? · You will have some vaginal bleeding. Wear sanitary pads. Do not douche or use tampons until your doctor says it is okay. ? · Ask your doctor when you can drive again. ? · You will probably need to take at least 6 weeks off work. It depends on the type of work you do and how you feel. ? · Ask your doctor when it is okay for you to have sex. Diet  ? · You can eat your normal diet. If your stomach is upset, try bland, low-fat foods like plain rice, broiled chicken, toast, and yogurt. ? · Drink plenty of fluids (unless your doctor tells you not to). ? · You may notice that your bowel movements are not regular right after your surgery. This is common. Try to avoid constipation and straining with bowel movements. You may want to take a fiber supplement every day. If you have not had a bowel movement after a couple of days, ask your doctor about taking a mild laxative. ? · If you are breastfeeding, do not drink any alcohol. Medicines  ? · Your doctor will tell you if and when you can restart your medicines. He or she will also give you instructions about taking any new medicines. ? · If you take blood thinners, such as warfarin (Coumadin), clopidogrel (Plavix), or aspirin, be sure to talk to your doctor. He or she will tell you if and when to start taking those medicines again.  Make sure that are short of breath, or cough up blood. ?Call your doctor now or seek immediate medical care if:  ? · You have pain that does not get better after you take pain medicine. ? · You have severe vaginal bleeding. ? · You are dizzy or lightheaded, or you feel like you may faint. ? · You have new or worse pain in your belly or pelvis. ? · You have loose stitches, or your incision comes open. ? · You have symptoms of infection, such as:  ¨ Increased pain, swelling, warmth, or redness. ¨ Red streaks leading from the incision. ¨ Pus draining from the incision. ¨ A fever. ? · You have symptoms of a blood clot in your leg (called a deep vein thrombosis), such as:  ¨ Pain in your calf, back of the knee, thigh, or groin. ¨ Redness and swelling in your leg or groin. ? Watch closely for changes in your health, and be sure to contact your doctor if:  ? · You do not get better as expected. Where can you learn more? Go to http://david-angela.info/. Enter M806 in the search box to learn more about \" Section: What to Expect at Home. \"  Current as of: 2017  Content Version: 11.4  © 5243-3514 [employer]. Care instructions adapted under license by [employer] (which disclaims liability or warranty for this information). If you have questions about a medical condition or this instruction, always ask your healthcare professional. [place] [address] any warranty or liability for your use of this information.

## 2017-11-14 NOTE — DISCHARGE SUMMARY
Obstetrical Discharge Summary     Name: Dionna Orourke MRN: [id number]  SSN: [id number]    YOB: 1995  Age: 25 y.o. Sex: female      Admit Date: 11/10/2017    Discharge Date: 2017     Admitting Physician: Hoa Rehman MD     Attending Physician:  Eryn Mascorro MD     * Admission Diagnoses: contractions;LGA (large for gestational age) fetus affectin*    * Discharge Diagnoses:   Information for the patient's :  Leeroy Benavides [[id number]]   Delivery of a 9 lb 3.4 oz (4.18 kg) male infant via , Low Transverse on 2017 at 4:30 AM  by . Apgars were 9 and 9. Additional Diagnoses:   Hospital Problems as of 2017  Date Reviewed: 2017          Codes Class Noted - Resolved POA    Normal labor ICD-10-CM: O80, Z37.9  ICD-9-CM: 825  2017 - Present Yes        LGA (large for gestational age) fetus affecting management of mother, third trimester, fetus 1 ICD-10-CM: I51.89Y7  ICD-9-CM: 656.63  2017 - Present Yes        Polyhydramnios affecting pregnancy in third trimester ICD-10-CM: O40. 3XX0  ICD-9-CM: 657.03  2017 - Present Yes        White classification A1 gestational diabetes mellitus ICD-10-CM: O24.410  ICD-9-CM: 648.80  10/11/2017 - Present     Overview Addendum 11/10/2017 12:49 PM by Cirilo Geller MD      Passed glucola x2 (last at 32wks)     Growht US at 34wks:   GP 85% (3089g--6#13), AC >99%  CARLOS 22 (largest pocket 7.7cm)---borderline Poly  treating as A1DM given LGA baby w/ borderline poly     10/11:  All values for fastings/1hr pp wnl --no meds at this time; discussed she can stop testing now  FOB was 10#8oz, and now 6ft 250lb     Will get BPP NV to re-eval CARLOS   Repeat growth 37-38wks to eval recommended route of delivery  (+/- bpp sooner to eval carlos)  Discussed increased risk c/s, shoulder dystocia, death      11/10:   BPP , vertex, again w/ Poly--CARLOS   Last growth  (10/31):  GP 82% (8#2, 4909g), AC 91%  Discussed potential option of elective cs given increased risk of shoulder dystocia, even though does not quite meet criteria   Pt declines this and opt for trial of vaginal delivery             * (Principal)LGA (large for gestational age) fetus affecting management of mother ICD-10-CM: O43.56X0  ICD-9-CM: 656.60  2017 - Present Yes    Overview Addendum 11/10/2017 12:49 PM by Eben Frias MD     Early glucola wnl  Repeated at 32wks (missed her 28wk) 107    ) GP 86%, AC 98%, CARLOS 19    Growht US today  GP 85% (3089g--6#13), AC >99%  CARLOS 22 (largest pocket 7.7cm)---borderline Poly    (10/6)   Will have pt perform FSBG for next 1-2wks  States has glucometer at home w/ lancets/testing strips; parameters given  RTO 1wk to look at [facility]  Will treast as A1DM  Repeat grewoth 4wks to eval recommended route of delivery  (+/- bpp sooner to eval carlos)    (10/31)  Last growth US (10/31):  GP 82% (8#2, 3679g), AC 91%                  Lab Results   Component Value Date/Time    ABO/Rh(D) B POSITIVE 11/10/2017 10:39 PM    Rubella, External Immune 2017    GrBStrep, External Positive in urine 10/20/2017    ABO,Rh B positive 2017      Immunization History   Administered Date(s) Administered    Influenza Vaccine (Quad) Mdck Pf 2017       * Procedures: cs  Procedure(s):   SECTION           * Discharge Condition: good and stable    * Hospital Course: Normal hospital course following the delivery. * Disposition: Home    Discharge Medications:   Current Discharge Medication List      START taking these medications    Details   oxyCODONE-acetaminophen (PERCOCET 7.5) 7.5-325 mg per tablet Take  1 or 2 tablets every 4 hours as needed for pain. Do not exceed 8 tablets in 24 hours  Qty: 30 Tab, Refills: 0      ibuprofen (MOTRIN) 800 mg tablet Take 1 Tab by mouth every eight (8) hours as needed for Pain.   Qty: 60 Tab, Refills: 0         CONTINUE these medications which have NOT CHANGED    Details   ascorbic acid, vitamin C, (VITAMIN C) 500 mg tablet Take 1 Tab by mouth two (2) times a day. Qty: 60 Tab, Refills: 2      ferrous sulfate (IRON, FERROUS SULFATE,) 325 mg (65 mg iron) tablet Take 1 Tab by mouth two (2) times a day. Qty: 60 Tab, Refills: 2      PNV#67-iron ps-FA cmb#1-dha (VITAFOL ULTRA) 29 mg iron- 1 mg-200 mg cap Take 1 Cap by mouth daily. Qty: 90 Cap, Refills: 3      doxylamine succinate (UNISOM) 25 mg tablet Take 25 mg by mouth nightly as needed for Sleep.      pyridoxine, vitamin B6, (VITAMIN B-6) 100 mg tablet Take 100 mg by mouth daily. STOP taking these medications       penicillin v potassium (VEETID) 500 mg tablet Comments:   Reason for Stopping:               * Follow-up Care/Patient Instructions:   Activity: No lifting, Driving, or Strenuous exercise for 2-3 weeks and No sex for 6 weeks  Diet: Regular Diet  Wound Care: Keep wound clean and dry    Follow-up Information     Follow up With Details Comments [address] [facility], [address] [place] [address]  [phone]             Signed By:  Rylan Brenner MD     November 14, 2017

## 2017-11-14 NOTE — LACTATION NOTE
Mom and baby are going home today. Continue to offer the breast without restriction. Mom's milk should be fully in over the next few days. Reviewed engorgement precautions. Hand Expression has been demoed and written hand-out reviewed. As milk comes in baby will be more alert at the breast and swallows will be more obvious. Breasts may feel softer once baby has finished nursing. Baby should be back to birth weight by 3weeks of age. And then gain on average 1 oz per day for the next 2-3 months. Reviewed babies should be exclusively breastfeeding for the first 6 months and that breastfeeding should continue after introduction of appropriate complimentary foods after 6 months. Initial output should be at least 1 wet and 1 bowel movement for each day old baby is. By day 5-7 once milk is fully in baby will consistently have 6 or more soaking wet diapers and about 4 bowel movement. Some babies have a bowel movement with every feeding and some have 1-3 large bowel movements each day. Inadequate output may indicate inadequate feedings and should be reported to your Pediatrician. Bowel habits may change as baby gets older. Encouraged follow-up at Pediatrician in 1-2 days, no later than 1 week of age. Call [facility] for any questions as needed or to set up an OP visit. OP phone calls are returned within 24 hours. Breastfeeding Support Group is offered here monthly. Community Breastfeeding Resource List given.

## 2017-11-14 NOTE — PROGRESS NOTES
Progress Note                               Patient: Shannon Avila MRN: [id number]  SSN: [id number]    YOB: 1995  Age: 25 y.o. Sex: female      2 Days Post-Op     Subjective:     Patient doing well postpartum without significant complaints. Voiding without difficulty. Patient reports normal lochia. . Breastfeeding: Yes   Desires early discharge. Ambulating without weakness, dizziness, pre-syncope. Objective:     Patient Vitals for the past 18 hrs:   Temp Pulse Resp BP   17 0715 99.2 °F (37.3 °C) 90 18 130/85   17 2335 98.1 °F (36.7 °C) 92 18 123/77       Temp (24hrs), Av.5 °F (36.9 °C), Min:98.1 °F (36.7 °C), Max:99.2 °F (37.3 °C)      Physical Exam:    General:   Patient without distress. Abdomen: Soft, fundus firm at level of umbilicus, nontender   Incision: Clean, dry, and intact without erythema   Lower Extremities: Negative for swelling, cords, or tenderness          Lab/Data Review:  CBC:    Recent Labs      17   0645  17   0602   WBC   --   9.7   HGB  7.4*  7.8*   HCT  23.4*  24.5*   PLT   --   198     GBS:   Lab Results   Component Value Date/Time    GrBStrep, External Positive in urine 10/20/2017     Blood Type:   Lab Results   Component Value Date/Time    ABO/Rh(D) B POSITIVE 11/10/2017 10:39 PM    ABO,Rh B positive 2017        Assessment and Plan:     Patient appears to be having an uncomplicated post- course. Continue routine postoperative care and maternal education. , Will discharge home today. and discussed sx anemia, precautions reviewed.     Signed By: Christa Mcmullen MD     2017

## 2017-11-14 NOTE — LACTATION NOTE
Individualized Feeding Plan for Breastfeeding   Lactation Services [phone]  As much as possible, hold your baby on your chest so babys bare skin is against your bare skin with a blanket covering babys back, especially 30 minutes before it is time for baby to eat. Watch for early feeding cues such as, licking lips, sucking motions, rooting, hands to mouth. Crying is a late feeding cue. Feed your baby at least 8 times in 24 hours, or more if your baby is showing feeding cues. If baby is sleepy put baby skin to skin and watch for hunger cues. To rouse baby: unwrap, undress, massage hands, feet, & back, change diaper, gently change babys position from lying to sitting. 15-20 minutes on the first breast of active breastfeeding is considered a good feeding. Good, active breastfeeding is when baby is alert, tugging the nipple, their ear may move, and you can hear swallows. Allow baby to finish the first side before changing sides. Sleeping at the breast or only brief, light sucks should not be considered a good, full breastfeed. At each feeding:  __x__1. Do Suck Practice on finger before each feeding until sucking pattern is smooth. Try using index finger. Nail down towards tongue. __x__2. Hand Express for a few minutes prior to latching to help start milk flow. __x__3. Baby needs to NURSE WELL x 15-20 minutes on at least first breast, burp and offer 2nd breast at every feeding. If no sustained latch only attempt at breast for 10 minutes. If baby does not latch on and feed well on at least one side, you should:   __x__4. Double pump for 15 minutes with breast massage and compression. Hand express for an additional 2-3 minutes per side. Pump after each feeding attempt or poor feeding, up to 8 times per day. If you are not putting baby to the breast you need to pump 8 times a day. Pump every at least every 3 hours. __x__5.  Give baby all of the breast milk you obtain using a straight syringe or  curved syringe. If baby does NOT have enough wet and dirty diapers per day, is jaundiced/lethargic, or has significant weight loss AND you do NOT pump enough milk for each feeding (per volume listed below), formula supplementation may need to be used. Call lactation department /pediatrician if you have concerns. AVERAGE INTAKES OF COLOSTRUM BY HEALTHY  INFANTS:  Time  Day Intake (ml/feed)  24-48 hrs  2 5-15 ml     48-72 hrs  3 15-30 ml (0.5-1 oz)  Based on every 3 hour feedings  72-96 hrs  4 30-45 ml (1-1.5oz)                           5         60-75 ml (2-2.5oz)                           6         75-90 ml (2.5-3oz)                           7        90-120ml (3-3.50z)    By day 7, baby will need 92 ml or 3 oz at each feeding based on 8 feedings per day & babys weight. (1oz = 30ml). Total milk volume needed in 24 hours by Day 7 is 24.5oz per day based on baby's birthweight of 9lbs 3oz. Use feeding plan until follow up with pediatrician. Continue to attempt at the breast for most feeds. Pump every 3 hours if no latch. Give all pumped colostrum/breastmilk at each feeding. MOm to call for follow up as needed. Outpatient services are located on the 4th floor at [facility]. Check in at the 4th floor registration desk (the same one you used when you came to have your baby). Call for questions [phone]     Breastfeeding Support Group: Meets most months in [address] in [address]. Days and times may vary. Please call [phone] or visit our website [email or web address] for the most current information. Support Group is free, but please register that you plan to attend.

## 2017-11-14 NOTE — PROGRESS NOTES
Report of care received from, [facility] RN. Bedside report given, pt denies further needs at present time.

## 2017-11-14 NOTE — LACTATION NOTE
This note was copied from a baby's chart. Infants mother stated she was worried about the drop in the infants weight, RN notified mother it is normal for infant to loss up to 10% of infants birth weight. Infant I/O sheet reviewed, infant has had several wet and stool diapers for the day. Encouraged to feed on demand and pump post feeding to increase supply. Reviewed no medical indication for supplementation at this time, questions encouraged and answered, denies any needs at this time.

## 2018-05-25 PROBLEM — E66.3 OVERWEIGHT: Status: ACTIVE | Noted: 2018-05-25

## 2018-05-25 PROBLEM — O24.410 WHITE CLASSIFICATION A1 GESTATIONAL DIABETES MELLITUS: Status: RESOLVED | Noted: 2017-10-11 | Resolved: 2018-05-25

## 2018-05-25 PROBLEM — O40.3XX0 POLYHYDRAMNIOS AFFECTING PREGNANCY IN THIRD TRIMESTER: Status: RESOLVED | Noted: 2017-11-11 | Resolved: 2018-05-25

## 2018-05-25 PROBLEM — O36.63X1 LGA (LARGE FOR GESTATIONAL AGE) FETUS AFFECTING MANAGEMENT OF MOTHER, THIRD TRIMESTER, FETUS 1: Status: RESOLVED | Noted: 2017-11-11 | Resolved: 2018-05-25

## 2018-05-25 PROBLEM — O40.9XX0 POLYHYDRAMNIOS: Status: RESOLVED | Noted: 2017-10-06 | Resolved: 2018-05-25

## 2018-05-25 PROBLEM — Z37.9 NORMAL LABOR: Status: RESOLVED | Noted: 2017-11-13 | Resolved: 2018-05-25

## 2018-05-25 PROBLEM — O36.60X0 LGA (LARGE FOR GESTATIONAL AGE) FETUS AFFECTING MANAGEMENT OF MOTHER: Status: RESOLVED | Noted: 2017-09-21 | Resolved: 2018-05-25

## 2018-05-31 PROBLEM — O43.109 ABNORMAL PLACENTA: Status: RESOLVED | Noted: 2017-06-30 | Resolved: 2018-05-31

## 2018-05-31 PROBLEM — D50.9 IRON DEFICIENCY ANEMIA: Status: RESOLVED | Noted: 2017-09-25 | Resolved: 2018-05-31

## 2018-05-31 PROBLEM — R82.71 GBS BACTERIURIA: Status: RESOLVED | Noted: 2017-09-26 | Resolved: 2018-05-31

## 2018-05-31 PROBLEM — Z86.32 HISTORY OF GESTATIONAL DIABETES: Status: ACTIVE | Noted: 2018-05-31

## 2018-10-03 PROBLEM — E66.3 OVERWEIGHT: Status: RESOLVED | Noted: 2018-05-25 | Resolved: 2018-10-03

## 2019-01-08 ENCOUNTER — HOSPITAL ENCOUNTER (OUTPATIENT)
Dept: ULTRASOUND IMAGING | Age: 24
Discharge: HOME | End: 2019-01-08
Attending: FAMILY MEDICINE
Payer: COMMERCIAL

## 2019-01-08 DIAGNOSIS — R10.11 RUQ ABDOMINAL PAIN: ICD-10-CM

## 2019-01-08 PROCEDURE — 76705 ECHO EXAM OF ABDOMEN: CPT

## 2019-01-15 NOTE — H&P (VIEW-ONLY)
Johana Garrett MD  
[facility] [place] [address], [address] [place] Phone [phone]   Fax [phone] Date of visit: 1/15/2019 Primary/Requesting provider: Alba Cohn DO Name: Sultana Hubbard MRN: [id number] : 1995 Age: 21 y.o. Sex: female PCP: Alba Cohn DO  
 
CC:   
Chief Complaint Patient presents with  New Patient  
  gallbladder HPI: 
 
 Sultana Hubbard is a 21 y.o. female who presents for evaluation of abdominal pain. She reports it started about 3-4 months ago mainly at night. She was started and antiacid and the pain slightly improved but has now worse. Pain started initially in the RA and has now moved to the RUQ. Pain radiated to her chest and her shoulder. No occasional nausea, no vomiting. No fever, chills. Pain is worse when eating fatty or spicy foods. Pain is better avoiding certain foods, ibuprofen will slightly help also. She has history of a  on 2017. She is an LPN. PMH: 
 
Past Medical History:  
Diagnosis Date  Allergic rhinitis  Iron deficiency anemia 2017 PSH: 
 
Past Surgical History:  
Procedure Laterality Date  HX  SECTION    
 HX TONSIL AND ADENOIDECTOMY MEDS: 
 
Current Outpatient Medications Medication Sig  
 norgestimate-ethinyl estradiol (ORTHO-CYCLEN, SPRINTEC) 0.25-35 mg-mcg tab Take 1 Tab by mouth daily.  omeprazole (PRILOSEC) 20 mg capsule Take 1 Cap by mouth daily. No current facility-administered medications for this visit. ALLERGIES:   
 
Allergies Allergen Reactions  Augmentin [Amoxicillin-Pot Clavulanate] Hives Pt has tolerated PCN G IV without reactions  Cefzil [Cefprozil] Hives SH: Social History Tobacco Use  Smoking status: Never Smoker  Smokeless tobacco: Never Used Substance Use Topics  Alcohol use: No  
 Drug use: No  
 
 
FH: 
 
Family History Problem Relation Age of Onset  Thyroid Disease Mother  Heart Disease Mother  Bipolar Disorder Mother  Anxiety Mother  Hypertension Father  Diabetes Maternal Grandfather  Kidney Disease Maternal Grandfather  Heart Disease Maternal Grandfather  Diabetes Paternal Grandmother  Cancer Paternal Grandmother   
     lung  Heart Disease Paternal Grandmother  Heart Disease Paternal Grandfather  Cancer Paternal Grandfather   
     lung and bone  COPD Paternal Grandfather  Heart Disease Maternal Grandmother  Other Maternal Grandmother   
     fibromyalgia  Arthritis-rheumatoid Maternal Grandmother  Breast Cancer Other  Colon Cancer Neg Hx   
 Ovarian Cancer Neg Hx Review of systems: 
Review of Systems Constitutional: Negative. HENT: Negative. Eyes: Negative. Respiratory: Negative. Cardiovascular: Negative. Gastrointestinal: Positive for abdominal pain, heartburn and nausea. Genitourinary: Negative. Musculoskeletal: Negative. Skin: Negative. Neurological: Negative. Endo/Heme/Allergies: Negative. Psychiatric/Behavioral: Negative. Physical Exam:  
 
Visit Vitals /71 Pulse 73 Ht 5' 2\" (1.575 m) Wt 126 lb (57.2 kg) BMI 23.05 kg/m² General:  Well-developed, well-nourished, no distress. Psych:  Cooperative, good insight and judgement. Neuro:  Alert, oriented to person, place and time. HEENT:  Normocephalic, atraumatic. Sclera clear. Lungs:  Unlabored breathing. Symmetrical chest expansion. Chest wall:  No tenderness or deformity. Heart:  Regular rate and rhythm. No JVD. Abdomen:  Soft, mildly-tender in RUQ, non-distended. No guarding or rebound. No hernias. Extremities:  Extremities normal, atraumatic, no cyanosis or edema. Skin:  Skin color, texture, turgor normal. No rashes. Labs:  All recent labs were reviewed. No elevated LFTs. Imaging: US images were independently reviewed by me. I agree with radiologist - she has gallstones. ICD-10-CM ICD-9-CM 1. Symptomatic cholelithiasis K80.20 574.20   
2. Right upper quadrant abdominal pain R10.11 789.01   
3. Nausea R11.0 787.02 Assessment/Plan:  Rizwan Hinds is a 21 y.o. female who has signs and symptoms consistent with symptomatic gallstones. 1. I recommend a laparoscopic, possible open, cholecystectomy. I discussed risks, benefits and alternatives of surgery. Patient understood and agreed to proceed with cholecystectomy. We discussed risks of cholecystectomy including but not limited to pain, infection, bleeding, scar, conversion from laparoscopic to open, injury to organs, enterotomy, cautery injury, hernia, injury to bile ducts, biliary leak, biliary stricture, retained stone, need for additional procedures, need for cholangiography, change in bowel habits. 2. Avoid fatty and spicy meals until after surgery to avoid an acute attack. ER precautions were given. 3.  Follow up 2 weeks after surgery Signed: Tank Brady MD 
Bariatric [facility] Minimally In[facility] Surgery 1/15/2019 10:05 AM

## 2019-01-23 ENCOUNTER — ANESTHESIA EVENT (OUTPATIENT)
Dept: SURGERY | Age: 24
End: 2019-01-23
Payer: COMMERCIAL

## 2019-01-24 ENCOUNTER — HOSPITAL ENCOUNTER (OUTPATIENT)
Age: 24
Setting detail: OUTPATIENT SURGERY
Discharge: HOME | End: 2019-01-24
Attending: SURGERY | Admitting: SURGERY
Payer: COMMERCIAL

## 2019-01-24 ENCOUNTER — ANESTHESIA (OUTPATIENT)
Dept: SURGERY | Age: 24
End: 2019-01-24
Payer: COMMERCIAL

## 2019-01-24 VITALS
TEMPERATURE: 99.2 F | WEIGHT: 127.31 LBS | BODY MASS INDEX: 23.43 KG/M2 | DIASTOLIC BLOOD PRESSURE: 77 MMHG | HEART RATE: 85 BPM | SYSTOLIC BLOOD PRESSURE: 119 MMHG | OXYGEN SATURATION: 93 % | RESPIRATION RATE: 17 BRPM | HEIGHT: 62 IN

## 2019-01-24 DIAGNOSIS — K80.20 SYMPTOMATIC CHOLELITHIASIS: Primary | ICD-10-CM

## 2019-01-24 LAB — HCG UR QL: NEGATIVE

## 2019-01-24 PROCEDURE — 77030020782 HC GWN BAIR PAWS FLX 3M -B: Performed by: ANESTHESIOLOGY

## 2019-01-24 PROCEDURE — 77030012022 HC APPL CLP ENDOSC COVD -C: Performed by: SURGERY

## 2019-01-24 PROCEDURE — 77030008522 HC TBNG INSUF LAPRO STRY -B: Performed by: SURGERY

## 2019-01-24 PROCEDURE — 81025 URINE PREGNANCY TEST: CPT

## 2019-01-24 PROCEDURE — 77030039425 HC BLD LARYNG TRULITE DISP TELE -A: Performed by: ANESTHESIOLOGY

## 2019-01-24 PROCEDURE — 74011250636 HC RX REV CODE- 250/636: Performed by: SURGERY

## 2019-01-24 PROCEDURE — 77030035051: Performed by: SURGERY

## 2019-01-24 PROCEDURE — 76210000021 HC REC RM PH II 0.5 TO 1 HR: Performed by: SURGERY

## 2019-01-24 PROCEDURE — 77030037892: Performed by: SURGERY

## 2019-01-24 PROCEDURE — 47562 LAPAROSCOPIC CHOLECYSTECTOMY: CPT | Performed by: SURGERY

## 2019-01-24 PROCEDURE — 77030035048 HC TRCR ENDOSC OPTCL COVD -B: Performed by: SURGERY

## 2019-01-24 PROCEDURE — 77030031139 HC SUT VCRL2 J&J -A: Performed by: SURGERY

## 2019-01-24 PROCEDURE — 76060000033 HC ANESTHESIA 1 TO 1.5 HR: Performed by: SURGERY

## 2019-01-24 PROCEDURE — 77030002933 HC SUT MCRYL J&J -A: Performed by: SURGERY

## 2019-01-24 PROCEDURE — 74011250636 HC RX REV CODE- 250/636

## 2019-01-24 PROCEDURE — 88304 TISSUE EXAM BY PATHOLOGIST: CPT

## 2019-01-24 PROCEDURE — 74011250636 HC RX REV CODE- 250/636: Performed by: ANESTHESIOLOGY

## 2019-01-24 PROCEDURE — 77030019908 HC STETH ESOPH SIMS -A: Performed by: ANESTHESIOLOGY

## 2019-01-24 PROCEDURE — 74011000250 HC RX REV CODE- 250: Performed by: SURGERY

## 2019-01-24 PROCEDURE — 76210000063 HC OR PH I REC FIRST 0.5 HR: Performed by: SURGERY

## 2019-01-24 PROCEDURE — 77030035029 HC NDL INSUF VERES DISP COVD -B: Performed by: SURGERY

## 2019-01-24 PROCEDURE — 77030032490 HC SLV COMPR SCD KNE COVD -B: Performed by: SURGERY

## 2019-01-24 PROCEDURE — 77030035220 HC TRCR ENDOSC BLNTPRT ANCHR COVD -B: Performed by: SURGERY

## 2019-01-24 PROCEDURE — 74011250637 HC RX REV CODE- 250/637: Performed by: ANESTHESIOLOGY

## 2019-01-24 PROCEDURE — 76010000161 HC OR TIME 1 TO 1.5 HR INTENSV-TIER 1: Performed by: SURGERY

## 2019-01-24 PROCEDURE — 77030037088 HC TUBE ENDOTRACH ORAL NSL COVD-A: Performed by: ANESTHESIOLOGY

## 2019-01-24 PROCEDURE — 74011000250 HC RX REV CODE- 250

## 2019-01-24 RX ORDER — MIDAZOLAM HYDROCHLORIDE 1 MG/ML
2 INJECTION, SOLUTION INTRAMUSCULAR; INTRAVENOUS ONCE
Status: COMPLETED | OUTPATIENT
Start: 2019-01-24 | End: 2019-01-24

## 2019-01-24 RX ORDER — OXYCODONE HYDROCHLORIDE 5 MG/1
5 TABLET ORAL
Status: DISCONTINUED | OUTPATIENT
Start: 2019-01-24 | End: 2019-01-24 | Stop reason: HOSPADM

## 2019-01-24 RX ORDER — GLYCOPYRROLATE 0.2 MG/ML
INJECTION INTRAMUSCULAR; INTRAVENOUS AS NEEDED
Status: DISCONTINUED | OUTPATIENT
Start: 2019-01-24 | End: 2019-01-24 | Stop reason: HOSPADM

## 2019-01-24 RX ORDER — SODIUM CHLORIDE 0.9 % (FLUSH) 0.9 %
5-40 SYRINGE (ML) INJECTION EVERY 8 HOURS
Status: DISCONTINUED | OUTPATIENT
Start: 2019-01-24 | End: 2019-01-24 | Stop reason: HOSPADM

## 2019-01-24 RX ORDER — PROPOFOL 10 MG/ML
INJECTION, EMULSION INTRAVENOUS AS NEEDED
Status: DISCONTINUED | OUTPATIENT
Start: 2019-01-24 | End: 2019-01-24 | Stop reason: HOSPADM

## 2019-01-24 RX ORDER — SODIUM CHLORIDE 0.9 % (FLUSH) 0.9 %
5-40 SYRINGE (ML) INJECTION AS NEEDED
Status: DISCONTINUED | OUTPATIENT
Start: 2019-01-24 | End: 2019-01-24 | Stop reason: HOSPADM

## 2019-01-24 RX ORDER — ROCURONIUM BROMIDE 10 MG/ML
INJECTION, SOLUTION INTRAVENOUS AS NEEDED
Status: DISCONTINUED | OUTPATIENT
Start: 2019-01-24 | End: 2019-01-24 | Stop reason: HOSPADM

## 2019-01-24 RX ORDER — FLUMAZENIL 0.1 MG/ML
0.2 INJECTION INTRAVENOUS
Status: DISCONTINUED | OUTPATIENT
Start: 2019-01-24 | End: 2019-01-24 | Stop reason: HOSPADM

## 2019-01-24 RX ORDER — ACETAMINOPHEN 500 MG
1000 TABLET ORAL ONCE
Status: DISCONTINUED | OUTPATIENT
Start: 2019-01-24 | End: 2019-01-24 | Stop reason: HOSPADM

## 2019-01-24 RX ORDER — OXYCODONE HYDROCHLORIDE 5 MG/1
10 TABLET ORAL
Status: COMPLETED | OUTPATIENT
Start: 2019-01-24 | End: 2019-01-24

## 2019-01-24 RX ORDER — NALOXONE HYDROCHLORIDE 0.4 MG/ML
0.2 INJECTION, SOLUTION INTRAMUSCULAR; INTRAVENOUS; SUBCUTANEOUS AS NEEDED
Status: DISCONTINUED | OUTPATIENT
Start: 2019-01-24 | End: 2019-01-24 | Stop reason: HOSPADM

## 2019-01-24 RX ORDER — SODIUM CHLORIDE, SODIUM LACTATE, POTASSIUM CHLORIDE, CALCIUM CHLORIDE 600; 310; 30; 20 MG/100ML; MG/100ML; MG/100ML; MG/100ML
100 INJECTION, SOLUTION INTRAVENOUS CONTINUOUS
Status: DISCONTINUED | OUTPATIENT
Start: 2019-01-24 | End: 2019-01-24 | Stop reason: HOSPADM

## 2019-01-24 RX ORDER — FENTANYL CITRATE 50 UG/ML
INJECTION, SOLUTION INTRAMUSCULAR; INTRAVENOUS AS NEEDED
Status: DISCONTINUED | OUTPATIENT
Start: 2019-01-24 | End: 2019-01-24 | Stop reason: HOSPADM

## 2019-01-24 RX ORDER — ONDANSETRON 2 MG/ML
INJECTION INTRAMUSCULAR; INTRAVENOUS AS NEEDED
Status: DISCONTINUED | OUTPATIENT
Start: 2019-01-24 | End: 2019-01-24 | Stop reason: HOSPADM

## 2019-01-24 RX ORDER — KETOROLAC TROMETHAMINE 30 MG/ML
INJECTION, SOLUTION INTRAMUSCULAR; INTRAVENOUS AS NEEDED
Status: DISCONTINUED | OUTPATIENT
Start: 2019-01-24 | End: 2019-01-24 | Stop reason: HOSPADM

## 2019-01-24 RX ORDER — MIDAZOLAM HYDROCHLORIDE 1 MG/ML
2 INJECTION, SOLUTION INTRAMUSCULAR; INTRAVENOUS
Status: DISCONTINUED | OUTPATIENT
Start: 2019-01-24 | End: 2019-01-24 | Stop reason: HOSPADM

## 2019-01-24 RX ORDER — DIPHENHYDRAMINE HYDROCHLORIDE 50 MG/ML
12.5 INJECTION, SOLUTION INTRAMUSCULAR; INTRAVENOUS
Status: DISCONTINUED | OUTPATIENT
Start: 2019-01-24 | End: 2019-01-24 | Stop reason: HOSPADM

## 2019-01-24 RX ORDER — CEFAZOLIN SODIUM/WATER 2 G/20 ML
2 SYRINGE (ML) INTRAVENOUS
Status: COMPLETED | OUTPATIENT
Start: 2019-01-24 | End: 2019-01-24

## 2019-01-24 RX ORDER — FENTANYL CITRATE 50 UG/ML
100 INJECTION, SOLUTION INTRAMUSCULAR; INTRAVENOUS ONCE
Status: DISCONTINUED | OUTPATIENT
Start: 2019-01-24 | End: 2019-01-24 | Stop reason: HOSPADM

## 2019-01-24 RX ORDER — LIDOCAINE HYDROCHLORIDE 10 MG/ML
0.1 INJECTION INFILTRATION; PERINEURAL AS NEEDED
Status: DISCONTINUED | OUTPATIENT
Start: 2019-01-24 | End: 2019-01-24 | Stop reason: HOSPADM

## 2019-01-24 RX ORDER — SODIUM CHLORIDE 9 MG/ML
25 INJECTION, SOLUTION INTRAVENOUS AS NEEDED
Status: DISCONTINUED | OUTPATIENT
Start: 2019-01-24 | End: 2019-01-24 | Stop reason: HOSPADM

## 2019-01-24 RX ORDER — OXYCODONE AND ACETAMINOPHEN 5; 325 MG/1; MG/1
1 TABLET ORAL
Qty: 30 TAB | Refills: 0 | Status: SHIPPED | OUTPATIENT
Start: 2019-01-24 | End: 2019-03-08

## 2019-01-24 RX ORDER — BUPIVACAINE HYDROCHLORIDE 2.5 MG/ML
INJECTION, SOLUTION EPIDURAL; INFILTRATION; INTRACAUDAL AS NEEDED
Status: DISCONTINUED | OUTPATIENT
Start: 2019-01-24 | End: 2019-01-24 | Stop reason: HOSPADM

## 2019-01-24 RX ORDER — NEOSTIGMINE METHYLSULFATE 1 MG/ML
INJECTION INTRAVENOUS AS NEEDED
Status: DISCONTINUED | OUTPATIENT
Start: 2019-01-24 | End: 2019-01-24 | Stop reason: HOSPADM

## 2019-01-24 RX ORDER — ACETAMINOPHEN 10 MG/ML
INJECTION, SOLUTION INTRAVENOUS AS NEEDED
Status: DISCONTINUED | OUTPATIENT
Start: 2019-01-24 | End: 2019-01-24 | Stop reason: HOSPADM

## 2019-01-24 RX ORDER — DEXAMETHASONE SODIUM PHOSPHATE 4 MG/ML
INJECTION, SOLUTION INTRA-ARTICULAR; INTRALESIONAL; INTRAMUSCULAR; INTRAVENOUS; SOFT TISSUE AS NEEDED
Status: DISCONTINUED | OUTPATIENT
Start: 2019-01-24 | End: 2019-01-24 | Stop reason: HOSPADM

## 2019-01-24 RX ORDER — LIDOCAINE HYDROCHLORIDE 20 MG/ML
INJECTION, SOLUTION EPIDURAL; INFILTRATION; INTRACAUDAL; PERINEURAL AS NEEDED
Status: DISCONTINUED | OUTPATIENT
Start: 2019-01-24 | End: 2019-01-24 | Stop reason: HOSPADM

## 2019-01-24 RX ORDER — HYDROMORPHONE HYDROCHLORIDE 2 MG/ML
0.5 INJECTION, SOLUTION INTRAMUSCULAR; INTRAVENOUS; SUBCUTANEOUS
Status: DISCONTINUED | OUTPATIENT
Start: 2019-01-24 | End: 2019-01-24 | Stop reason: HOSPADM

## 2019-01-24 RX ADMIN — SODIUM CHLORIDE, SODIUM LACTATE, POTASSIUM CHLORIDE, AND CALCIUM CHLORIDE: 600; 310; 30; 20 INJECTION, SOLUTION INTRAVENOUS at 16:10

## 2019-01-24 RX ADMIN — PROPOFOL 200 MG: 10 INJECTION, EMULSION INTRAVENOUS at 15:20

## 2019-01-24 RX ADMIN — SODIUM CHLORIDE, SODIUM LACTATE, POTASSIUM CHLORIDE, AND CALCIUM CHLORIDE 100 ML/HR: 600; 310; 30; 20 INJECTION, SOLUTION INTRAVENOUS at 13:46

## 2019-01-24 RX ADMIN — Medication 2 G: at 15:26

## 2019-01-24 RX ADMIN — FENTANYL CITRATE 100 MCG: 50 INJECTION, SOLUTION INTRAMUSCULAR; INTRAVENOUS at 15:20

## 2019-01-24 RX ADMIN — OXYCODONE HYDROCHLORIDE 10 MG: 5 TABLET ORAL at 16:47

## 2019-01-24 RX ADMIN — LIDOCAINE HYDROCHLORIDE 60 MG: 20 INJECTION, SOLUTION EPIDURAL; INFILTRATION; INTRACAUDAL; PERINEURAL at 15:20

## 2019-01-24 RX ADMIN — ROCURONIUM BROMIDE 5 MG: 10 INJECTION, SOLUTION INTRAVENOUS at 15:46

## 2019-01-24 RX ADMIN — GLYCOPYRROLATE 0.4 MG: 0.2 INJECTION INTRAMUSCULAR; INTRAVENOUS at 16:06

## 2019-01-24 RX ADMIN — MIDAZOLAM HYDROCHLORIDE 2 MG: 2 INJECTION, SOLUTION INTRAMUSCULAR; INTRAVENOUS at 14:14

## 2019-01-24 RX ADMIN — ONDANSETRON 4 MG: 2 INJECTION INTRAMUSCULAR; INTRAVENOUS at 15:57

## 2019-01-24 RX ADMIN — ROCURONIUM BROMIDE 25 MG: 10 INJECTION, SOLUTION INTRAVENOUS at 15:20

## 2019-01-24 RX ADMIN — ACETAMINOPHEN 1000 MG: 10 INJECTION, SOLUTION INTRAVENOUS at 16:04

## 2019-01-24 RX ADMIN — FENTANYL CITRATE 50 MCG: 50 INJECTION, SOLUTION INTRAMUSCULAR; INTRAVENOUS at 16:20

## 2019-01-24 RX ADMIN — KETOROLAC TROMETHAMINE 30 MG: 30 INJECTION, SOLUTION INTRAMUSCULAR; INTRAVENOUS at 15:57

## 2019-01-24 RX ADMIN — DEXAMETHASONE SODIUM PHOSPHATE 4 MG: 4 INJECTION, SOLUTION INTRA-ARTICULAR; INTRALESIONAL; INTRAMUSCULAR; INTRAVENOUS; SOFT TISSUE at 15:57

## 2019-01-24 RX ADMIN — NEOSTIGMINE METHYLSULFATE 3 MG: 1 INJECTION INTRAVENOUS at 16:06

## 2019-01-24 RX ADMIN — FENTANYL CITRATE 50 MCG: 50 INJECTION, SOLUTION INTRAMUSCULAR; INTRAVENOUS at 15:43

## 2019-01-24 NOTE — OP NOTES
Operative Report    Name: Grace Felix      MRN: [id number]       : 1995       Age: 21 y.o. Sex: female    Date of Surgery: 2019     Preoperative Diagnosis: Gallstones [K80.20]     Postoperative Diagnosis: Gallstones [K80.20]     Name of procedure:  Procedure(s):  CHOLECYSTECTOMY LAPAROSCOPIC. Surgeon:  Surgeon(s) and Role:     * Apollo Hayes MD - Primary    Anesthesia: General     Complications: None    Estimated Blood Loss: Minimal           Specimens:   ID Type Source Tests Collected by Time Destination   1 : Gallbladder Preservative Gallbladder  Apollo Hyaes MD 2019 1601 Pathology       Findings: Normal anatomy. Gallstones. Statement of Medical Necessity: Grace Felix is a 21 y.o. female who presented to the clinic complaining of RUQ pain with eating that has been getting worse. Patient had an [address] showing gallstones. A cholecystectomy was recommended. We discussed risks, benefits and alternatives of surgery. Patient understood and agreed to proceed with laparoscopic cholecystectomy. We discussed risks of cholecystectomy including but not limited to pain, infection, bleeding, scar, conversion from laparoscopic to open, injury to organs, enterotomy, cautery injury, hernia, injury to bile ducts, biliary leak, biliary stricture, retained stone, need for additional procedures, need for cholangiography, change in bowel habits. Procedure Details   After informed consent was taken, patient was taken to the operating room and placed in supine position. Anesthesia was induced and patient was intubated. Patient received preoperative antibiotics. Abdomen was prepped and draped in standard sterile fashion. A timeout was performed with all team member present. A 1 cm vertical incision was made in the infraumbilical area. Dissection was carried with electrocautery to the fascia.  The fascia was elevated and bilateral stay sutures were placed using 0-Vicryl. A Veress needle was inserted in the right upper quadrant and the abdomen was insufflated with carbon dioxide to a pressure of 15 mmHg. The patient tolerated insufflation well. The umbilical fascia was incised and the abdomen was bluntly accessed. A 12 mm Irena trocar was placed. A laparoscope was inserted and there was no evidence of intraabdominal injury. Three additional 5 mm ports were inserted in the right upper quadrant under direct visualization. The patient was placed in reverse Trendelenberg with her right side up. The fundus of the gallbladder was retracted cephalad with a grasper. The infundibulum of the gallbladder was exposed. The infundibulum was retracted inferiorly and laterally. Using a combination of hook cautery and blunt dissection, we cleared the triangle of Calot of peritoneum and fat both anteriorly and posteriorly. This dissection was extended laterally and cephalad on the anterior and posterior walls of the gallbladder. The cystic duct was identified at its connection with the gallbladder infundibulum and circumferentially dissected. The anterior cystic artery was identified and dissected circumferentially. A critical view was obtained of the triangle of Calot both anteriorly and posteriorly. Pictures of the critical view were taken for documentation. Both the cystic artery and cystic duct were clipped twice proximally and once distally. The cystic artery and cystic duct were divided sharply. The gallbladder was dissected off the gallbladder fossa using a hook cautery. Hemostasis was verified along the liver bed and the clips were visualized with no evidence of bile leaking or bleeding. The gallbladder was placed in an endocatch bag and removed through the umbilical incision. The liver bed and clips were again visualized and in place, there was good hemostasis. The umbilical fascia was closed with 0-Vicryl sutures in interrupted fashion using the endoclosure device. The ports were removed and the abdomen was desufflated. The skin of all cannula insertion sites was closed with 4-0 Monocryl subcuticular sutures. Mastisol and Steri strips were applied to the skin incisions. All counts were reported as correct. The patient was awakened from anesthesia, transferred to a stretcher, and transported to the PACU in good condition.         Signed by: Karin Martinez MD  [facility] - Bariatric & Minimally Invasive Surgery  1/24/2019 4:08 PM

## 2019-01-24 NOTE — BRIEF OP NOTE
BRIEF OPERATIVE NOTE Date of Procedure: 1/24/2019 Preoperative Diagnosis: Gallstones [K80.20] Postoperative Diagnosis: Gallstones [K80.20] Procedure(s): CHOLECYSTECTOMY LAPAROSCOPIC Surgeon(s) and Role: Clementina Ghotra MD - Primary Surgical Assistant: None Surgical Staff: 
Circ-1: Derick Nascimento Scrub Tech-1: Vicky Luna CNA Scrub Tech-2: Shell Silver Event Time In Time Out Incision Start [phone] Incision Close Anesthesia: General  
Estimated Blood Loss: Minimal 
Specimens:  
ID Type Source Tests Collected by Time Destination 1 : Gallbladder Preservative Gallbladder  Clementina Ghotra MD 1/24/2019 1601 Pathology Findings: Normal anatomy Complications: None Implants: * No implants in log *

## 2019-01-24 NOTE — ANESTHESIA PREPROCEDURE EVALUATION
Anesthetic History No history of anesthetic complications Review of Systems / Medical History Patient summary reviewed and pertinent labs reviewed Pulmonary Within defined limits Neuro/Psych Within defined limits Cardiovascular Exercise tolerance: >4 METS 
  
GI/Hepatic/Renal 
  
GERD: well controlled Endo/Other Within defined limits Other Findings Physical Exam 
 
Airway Mallampati: II 
TM Distance: 4 - 6 cm Neck ROM: normal range of motion Mouth opening: Normal 
 
 Cardiovascular Rhythm: regular Rate: normal 
 
 
 
 Dental 
 
 
  
Pulmonary Breath sounds clear to auscultation Abdominal 
 
 
 
 Other Findings Anesthetic Plan ASA: 2 Anesthesia type: general 
 
 
 
 
Induction: Intravenous Anesthetic plan and risks discussed with: Patient, Mother and Spouse

## 2019-01-24 NOTE — ANESTHESIA POSTPROCEDURE EVALUATION
Procedure(s): CHOLECYSTECTOMY LAPAROSCOPIC. Anesthesia Post Evaluation Patient location during evaluation: PACU Patient participation: complete - patient participated Level of consciousness: awake Pain management: satisfactory to patient Airway patency: patent Anesthetic complications: no 
Cardiovascular status: hemodynamically stable Respiratory status: spontaneous ventilation Hydration status: euvolemic Post anesthesia nausea and vomiting:  none Visit Vitals /77 (BP 1 Location: Right arm, BP Patient Position: Supine; Head of bed elevated (Comment degrees)) Pulse 85 Temp 37.3 °C (99.2 °F) Resp 17 Ht 5' 2\" (1.575 m) Wt 57.7 kg (127 lb 5 oz) SpO2 93% BMI 23.29 kg/m² Bedside sat 96%.

## 2019-01-24 NOTE — INTERVAL H&P NOTE
H&P Update: 
Susi Nuñez was seen and examined. History and physical has been reviewed. The patient has been examined.  There have been no significant clinical changes since the completion of the originally dated History and Physical. 
 
Signed By: Vanita Waters MD   
 January 24, 2019 2:37 PM

## 2019-01-24 NOTE — DISCHARGE INSTRUCTIONS
No bathtub or pool for 2 weeks. No weight lifting greater than 20 lbs for 4 weeks. Avoid fatty and spicy foods for 2-3 days and then introduce then slowly. Leave the Steri strips in place. They will fall off on their own in 7-10 days. Take tylenol or ibuprofen for as needed for mild pain every 4-6 hours. Percocet prescribed for mod to severe pain. This is a narcotic and can cause drowsiness, nausea and vomiting and constipation. Take Colace 100mg BID (over the counter) if constipated. After general anesthesia or intravenous sedation, for 24 hours or while taking prescription Narcotics:  · Limit your activities  · Do not drive and operate hazardous machinery  · Do not make important personal or business decisions  · Do  not drink alcoholic beverages  · If you have not urinated within 8 hours after discharge, please contact your surgeon on call. *  Please give a list of your current medications to your Primary Care Provider. *  Please update this list whenever your medications are discontinued, doses are      changed, or new medications (including over-the-counter products) are added. *  Please carry medication information at all times in case of emergency situations. These are general instructions for a healthy lifestyle:  No smoking/ No tobacco products/ Avoid exposure to second hand smoke  Surgeon General's Warning:  Quitting smoking now greatly reduces serious risk to your health. Obesity, smoking, and sedentary lifestyle greatly increases your risk for illness  A healthy diet, regular physical exercise & weight monitoring are important for maintaining a healthy lifestyle    You may be retaining fluid if you have a history of heart failure or if you experience any of the following symptoms:  Weight gain of 3 pounds or more overnight or 5 pounds in a week, increased swelling in our hands or feet or shortness of breath while lying flat in bed.   Please call your doctor as soon as you notice any of these symptoms; do not wait until your next office visit. Recognize signs and symptoms of STROKE:  F-face looks uneven  A-arms unable to move or move unevenly  S-speech slurred or non-existent  T-time-call 911 as soon as signs and symptoms begin-DO NOT go       Back to bed or wait to see if you get better-TIME IS BRAIN.

## 2019-07-14 PROBLEM — Z80.3 FAMILY HISTORY OF BREAST CANCER: Status: ACTIVE | Noted: 2019-07-14

## 2022-03-18 PROBLEM — Z86.32 HISTORY OF GESTATIONAL DIABETES: Status: ACTIVE | Noted: 2018-05-31

## 2022-03-19 PROBLEM — N83.202 CYST OF LEFT OVARY: Status: ACTIVE | Noted: 2017-04-06

## 2022-03-19 PROBLEM — G93.0 CHOROID PLEXUS CYST: Status: ACTIVE | Noted: 2017-06-30

## 2022-03-19 PROBLEM — Z80.3 FAMILY HISTORY OF BREAST CANCER: Status: ACTIVE | Noted: 2019-07-14

## 2022-08-18 ENCOUNTER — OFFICE VISIT (OUTPATIENT)
Dept: OBGYN CLINIC | Age: 27
End: 2022-08-18
Payer: COMMERCIAL

## 2022-08-18 VITALS
WEIGHT: 136 LBS | HEIGHT: 62 IN | BODY MASS INDEX: 25.03 KG/M2 | DIASTOLIC BLOOD PRESSURE: 74 MMHG | SYSTOLIC BLOOD PRESSURE: 120 MMHG

## 2022-08-18 DIAGNOSIS — N92.6 MISSED MENSES: Primary | ICD-10-CM

## 2022-08-18 DIAGNOSIS — Z87.59 HISTORY OF DELIVERY OF MACROSOMAL INFANT: ICD-10-CM

## 2022-08-18 DIAGNOSIS — Z80.9 FAMILY HISTORY OF CANCER: ICD-10-CM

## 2022-08-18 DIAGNOSIS — Z98.891 HISTORY OF CESAREAN DELIVERY: ICD-10-CM

## 2022-08-18 DIAGNOSIS — Z34.91 ENCOUNTER FOR SUPERVISION OF NORMAL PREGNANCY IN FIRST TRIMESTER, UNSPECIFIED GRAVIDITY: ICD-10-CM

## 2022-08-18 DIAGNOSIS — Z80.3 FAMILY HISTORY OF BREAST CANCER: ICD-10-CM

## 2022-08-18 LAB
ABO + RH BLD: NORMAL
BLOOD GROUP ANTIBODIES SERPL: NORMAL

## 2022-08-18 PROCEDURE — 99214 OFFICE O/P EST MOD 30 MIN: CPT | Performed by: OBSTETRICS & GYNECOLOGY

## 2022-08-18 PROCEDURE — 76830 TRANSVAGINAL US NON-OB: CPT | Performed by: OBSTETRICS & GYNECOLOGY

## 2022-08-18 RX ORDER — LANOLIN ALCOHOL/MO/W.PET/CERES
50 CREAM (GRAM) TOPICAL DAILY
COMMUNITY

## 2022-08-18 RX ORDER — ONDANSETRON 8 MG/1
TABLET, ORALLY DISINTEGRATING ORAL
COMMUNITY
Start: 2022-08-09

## 2022-08-18 NOTE — PROGRESS NOTES
CC:  Missed Period      HPI:  32 y.o. Julius Lester presents for pregnancy confirmation and establish CARITO. No LMP recorded. Patient is pregnant. ,   sure, regular cycles. mild n/v -- controlled w/ Zofran   no vb/cramping        Has had COVID 19 -- 1/2022 -- very mild   HAS been COVID 19 vaccinated       OB hx:  G1 primary LTCS 11/16/17 w/ Dr Drew Marie at 39w5d after FTP/cephalopelvic disproportion (9#3), polyhydramnios \"Buchanan\"    No hx GDM, PreE /GHTN    Desires repeat c/s ___              Fam hx Cancers: Maternal Aunt w/ Breast Cancer at age 48yo  S/p lumpectomy, MTX and radiation  Sees [facility]    States BRCA 1/2 neg     PAunt dx w/ Colon CA at 35yo        Pt states both of her family members had genetic testing and were NEG. Would recommend earlier screening given fam hx alone for both breast and colon           Fam hx any chromosomal or inheritable disorders: none       Her Ob history is as follows:  # 1 - Date: None, Sex: None, Weight: None, GA: None, Delivery: None, Apgar1: None, Apgar5: None, Living: None, Birth Comments: None      Past medical History:    Active Ambulatory Problems     Diagnosis Date Noted    History of gestational diabetes 05/31/2018    Choroid plexus cyst 06/30/2017    Cyst of left ovary 04/06/2017    Family history of breast cancer 07/14/2019    Family history of cancer     Allergic rhinitis      Resolved Ambulatory Problems     Diagnosis Date Noted    No Resolved Ambulatory Problems     Past Medical History:   Diagnosis Date    Adverse effect of anesthesia     GERD (gastroesophageal reflux disease)     Iron deficiency anemia 9/25/2017       Current Outpatient Medications   Medication Sig    ondansetron (ZOFRAN-ODT) 8 MG TBDP disintegrating tablet LET 1 TABLET DISSOLVE ON TOP OF THE TONGUE TWICE A DAY AS NEEDED    Prenatal MV-Min-Fe Fum-FA-DHA (PRENATAL MULTIVITAMIN + DHA PO) Take by mouth    Doxylamine Succinate, Sleep, (UNISOM PO) Take by mouth    vitamin B-6 (PYRIDOXINE) 50 MG tablet Take 50 mg by mouth daily    fluconazole (DIFLUCAN) 150 MG tablet Take 1 tablet and repeat in 3-4 days. loratadine (CLARITIN) 10 MG tablet Take 10 mg by mouth     No current facility-administered medications for this visit. Past Surgical:  has a past surgical history that includes Tonsillectomy and adenoidectomy;  section; and Cholecystectomy (2019). Allergies   Allergen Reactions    Cefprozil Hives         Current Outpatient Medications on File Prior to Visit   Medication Sig Dispense Refill    ondansetron (ZOFRAN-ODT) 8 MG TBDP disintegrating tablet LET 1 TABLET DISSOLVE ON TOP OF THE TONGUE TWICE A DAY AS NEEDED      Prenatal MV-Min-Fe Fum-FA-DHA (PRENATAL MULTIVITAMIN + DHA PO) Take by mouth      Doxylamine Succinate, Sleep, (UNISOM PO) Take by mouth      vitamin B-6 (PYRIDOXINE) 50 MG tablet Take 50 mg by mouth daily      fluconazole (DIFLUCAN) 150 MG tablet Take 1 tablet and repeat in 3-4 days. loratadine (CLARITIN) 10 MG tablet Take 10 mg by mouth       No current facility-administered medications on file prior to visit. GYN hx:    Last Pap:  2020--neg cytology  Hx of Abnl Paps: no    Sexual History:  No hx  STDs      Cortney Vazquez  reports that she has never smoked. She has never used smokeless tobacco. She reports that she does not drink alcohol and does not use drugs. /74   Ht 5' 2\" (1.575 m)   Wt 136 lb (61.7 kg)   BMI 24.87 kg/m²     Physical Exam:  Constitutional: She appears well-developed and well-nourished. No distress. HENT:    Head: Normocephalic and atraumatic. Cardiovascular: Regular pulse   Pulmonary/Chest: Effort normal  Skin: She is not diaphoretic. Psychiatric: She has a normal mood and affect. Her behavior is normal. Thought content normal. .          Pelvic US today:  + IUP WITH + FHM  + YOLK SAC  CRL= 10. 2WKS  RT OV- RESOLVING CL CYST  LT OV- VISUALIZED TA ONLY, APPEARS WNL  NO F                   Counseling:  -Continue PNV with at least 769IDV Folic acid QD (4mg if previous pregnancy complicated by a fetal NTD)  -B6/Unisom (Diclegis) if nausea/vomitin g  -Calcium:  recommend 1000mg/QD  (500 in 2 Tums), milk, cheese, yogurt, leafy green vegetables  -Iron:  30mg every day (red meat, dark beans)  -Counseled on appropriate foods to avoid in pregnancy:   -unpasteurized milk/cheeses   -hot dogs, luncheon meats, cold cuts unless heated until steaming    -refrigerated wolfe and meat spreads   -raw/undercooked seafood, eggs, meat  -Avoid litter box if have cat(s)   -Genetic screening options discussed          Patient counseled face to face for more than 50% of the total time spent with the patient. On this date I have spent 31 minutes reviewing previous notes, test results, and face to face with the patient discussing the diagnosis and importance of compliance with the treatment plan as well as documenting.           Assessment/Plan:    32 y.o.  at 10w2d by d=10wk US with  IUP:    1) Routine pregnancy:  -PN labs today  -FU w/ RN in 1-2 wks for new pregnancy counseling   -RTO for New OB visti in 4wks             Kindra Swenson MD

## 2022-08-19 LAB
HBV SURFACE AG SER QL: NONREACTIVE
HCV AB SER QL: NONREACTIVE
RPR SER QL: NONREACTIVE

## 2022-08-20 LAB
HIV 1+2 AB+HIV1 P24 AG SERPL QL IA: NONREACTIVE
HIV 1/2 RESULT COMMENT: NORMAL

## 2022-08-21 LAB
BACTERIA SPEC CULT: NORMAL
SERVICE CMNT-IMP: NORMAL

## 2022-08-23 LAB
BASOPHILS # BLD: 0.1 K/UL (ref 0–0.2)
BASOPHILS NFR BLD: 1 % (ref 0–2)
DIFFERENTIAL METHOD BLD: NORMAL
EOSINOPHIL # BLD: 0.1 K/UL (ref 0–0.8)
EOSINOPHIL NFR BLD: 2 % (ref 0.5–7.8)
ERYTHROCYTE [DISTWIDTH] IN BLOOD BY AUTOMATED COUNT: 12.5 % (ref 11.9–14.6)
HCT VFR BLD AUTO: 37.1 % (ref 35.8–46.3)
HGB BLD-MCNC: 12.1 G/DL (ref 11.7–15.4)
IMM GRANULOCYTES # BLD AUTO: 0.1 K/UL (ref 0–0.5)
IMM GRANULOCYTES NFR BLD AUTO: 1 % (ref 0–5)
LYMPHOCYTES # BLD: 2 K/UL (ref 0.5–4.6)
LYMPHOCYTES NFR BLD: 34 % (ref 13–44)
MCH RBC QN AUTO: 29 PG (ref 26.1–32.9)
MCHC RBC AUTO-ENTMCNC: 32.6 G/DL (ref 31.4–35)
MCV RBC AUTO: 89 FL (ref 79.6–97.8)
MONOCYTES # BLD: 0.4 K/UL (ref 0.1–1.3)
MONOCYTES NFR BLD: 6 % (ref 4–12)
NEUTS SEG # BLD: 3.2 K/UL (ref 1.7–8.2)
NEUTS SEG NFR BLD: 56 % (ref 43–78)
NRBC # BLD: 0 K/UL (ref 0–0.2)
PLATELET # BLD AUTO: 231 K/UL (ref 150–450)
PMV BLD AUTO: 11 FL (ref 9.4–12.3)
RBC # BLD AUTO: 4.17 M/UL (ref 4.05–5.2)
WBC # BLD AUTO: 5.8 K/UL (ref 4.3–11.1)

## 2022-09-26 ENCOUNTER — ROUTINE PRENATAL (OUTPATIENT)
Dept: OBGYN CLINIC | Age: 27
End: 2022-09-26

## 2022-09-26 VITALS — SYSTOLIC BLOOD PRESSURE: 114 MMHG | DIASTOLIC BLOOD PRESSURE: 68 MMHG | WEIGHT: 138 LBS | BODY MASS INDEX: 25.24 KG/M2

## 2022-09-26 DIAGNOSIS — Z11.3 SCREEN FOR STD (SEXUALLY TRANSMITTED DISEASE): ICD-10-CM

## 2022-09-26 DIAGNOSIS — R30.0 DYSURIA: ICD-10-CM

## 2022-09-26 DIAGNOSIS — Z13.1 SCREENING FOR DIABETES MELLITUS: ICD-10-CM

## 2022-09-26 DIAGNOSIS — Z09 POSTOPERATIVE FOLLOW-UP: ICD-10-CM

## 2022-09-26 DIAGNOSIS — Z34.80 SUPERVISION OF OTHER NORMAL PREGNANCY: Primary | ICD-10-CM

## 2022-09-26 DIAGNOSIS — Z87.59 HISTORY OF DELIVERY OF MACROSOMAL INFANT: ICD-10-CM

## 2022-09-26 PROBLEM — Z86.32 HISTORY OF GESTATIONAL DIABETES: Status: RESOLVED | Noted: 2018-05-31 | Resolved: 2022-09-26

## 2022-09-26 PROBLEM — G93.0 CHOROID PLEXUS CYST: Status: RESOLVED | Noted: 2017-06-30 | Resolved: 2022-09-26

## 2022-09-26 LAB — RUBV IGG SERPL IA-ACNC: 286.3 IU/ML (ref 0–50)

## 2022-09-26 PROCEDURE — 99902 PR PRENATAL VISIT: CPT | Performed by: OBSTETRICS & GYNECOLOGY

## 2022-09-26 NOTE — PROGRESS NOTES
CC: New OB exam    HPI:  32 y.o.  Miryam Garnica presents today for a New OB examination at 15w6d  by d=10wk. Improved  n/v.   no cramping/VB    Genetics: declined       Has had COVID 19 -- 2022 -- very mild   HAS been COVID 19 vaccinated          C/o some off and on dysuria    No fevers  States completed a round of Keflex    Urine culture today __          OB hx:  G1 primary LTCS / w/ Dr Joey Jeans at 39w5d after FTP/cephalopelvic disproportion (9#3), polyhydramnios \"[place]\". Passed glucola x2. Treated as A1DM due to fetal size w/ Poly    No hx PreE /GHTN     Desires repeat c/s ___          Hx Macrosomia:   Hgb A1C today               Fam hx Cancers: Maternal Aunt w/ Breast Cancer at age 46yo  S/p lumpectomy, MTX and radiation  Sees [facility]    States BRCA 1/2 neg     PAunt dx w/ Colon CA at 35yo        Pt states both of her family members had genetic testing and were NEG.    Would recommend earlier screening given fam hx alone for both breast and colon              Fam hx any chromosomal or inheritable disorders: none         OB HISTORY:   OB History          2    Para   1    Term   1            AB        Living   1         SAB        IAB        Ectopic        Molar        Multiple        Live Births   1                  GYN HISTORY:  Last Pap:  2020--neg cytology  Hx of Abnl Paps: no     Sexual History:  No hx  STDs          Past Medical History:   Diagnosis Date    Adverse effect of anesthesia     mom- has difficulty awakening, periods of apnea    Allergic rhinitis     Family history of cancer     breast, colon    GERD (gastroesophageal reflux disease)     Iron deficiency anemia 2017         Past Surgical History:   Procedure Laterality Date     SECTION  2017    CHOLECYSTECTOMY  2019    TONSILLECTOMY AND ADENOIDECTOMY           Outpatient Encounter Medications as of 2022   Medication Sig Dispense Refill    ondansetron (ZOFRAN-ODT) 8 MG TBDP disintegrating tablet LET 1 TABLET DISSOLVE ON TOP OF THE TONGUE TWICE A DAY AS NEEDED      Prenatal MV-Min-Fe Fum-FA-DHA (PRENATAL MULTIVITAMIN + DHA PO) Take by mouth      Doxylamine Succinate, Sleep, (UNISOM PO) Take by mouth      vitamin B-6 (PYRIDOXINE) 50 MG tablet Take 50 mg by mouth daily      cephALEXin (KEFLEX) 500 MG capsule Take 500 mg by mouth 3 times daily       No facility-administered encounter medications on file as of 9/26/2022. Allergies   Allergen Reactions    Cefprozil Hives         Family History   Problem Relation Age of Onset    COPD Paternal Grandfather     Heart Disease Maternal Grandmother     Other Maternal Grandmother         fibromyalgia    Rheum Arthritis Maternal Grandmother     Breast Cancer Other     Breast Cancer Maternal Aunt     Colon Cancer Paternal Aunt 43    Ovarian Cancer Neg Hx     Thyroid Disease Mother     Heart Disease Mother     Bipolar Disorder Mother     Anxiety Disorder Mother     Hypertension Father     Diabetes Maternal Grandfather     Kidney Disease Maternal Grandfather     Heart Disease Maternal Grandfather     Diabetes Paternal Grandmother     Cancer Paternal Grandmother         lung    Heart Disease Paternal Grandmother     Heart Disease Paternal Grandfather     Cancer Paternal Grandfather         lung and bone         Social History     Socioeconomic History    Marital status:    Tobacco Use    Smoking status: Never    Smokeless tobacco: Never   Vaping Use    Vaping Use: Never used   Substance and Sexual Activity    Alcohol use: No    Drug use: No           ROS:  Review of Systems   Constitutional: Negative for chills, fever and weight loss. HENT: Negative for hearing loss. Eyes: Negative for blurred vision and double vision. Respiratory: Negative for cough, hemoptysis and shortness of breath. Cardiovascular: Negative for chest pain, palpitations and orthopnea.    Gastrointestinal: Negative for abdominal pain, blood in stool, constipation, diarrhea, nausea and vomiting. Genitourinary: Negative for dysuria, frequency, hematuria and urgency. Musculoskeletal: Negative for falls, joint pain and myalgias. Skin: Negative for itching and rash. Neurological: Negative for headaches. Endo/Heme/Allergies: Does not bruise/bleed easily. Psychiatric/Behavioral: Negative for depression and suicidal ideas. The patient is not nervous/anxious. All other systems reviewed and are negative. PHYSICAL EXAM:  Blood pressure 114/68, weight 138 lb (62.6 kg), last menstrual period 2022. Constitutional: She appears well-developed and well-nourished. No distress. HENT:    Head: Normocephalic and atraumatic. Cardiovascular: Normal rate, regular rhythm and normal heart sounds. Exam reveals no gallop and no friction rub. No murmur heard. Pulmonary/Chest: Effort normal and breath sounds normal. No respiratory distress. She has no wheezes. She has no rales. Abdominal: Soft. Bowel sounds are normal. There is no tenderness. There is no rebound and no guarding. Gravid. Skin: She is not diaphoretic. Psychiatric: She has a normal mood and affect.  Her behavior is normal. Thought content normal. .    Pelvic:   External genitalia wnl, no lesions, rashes  Clitoris and urethra midline  Vagina pink, moist, well rugated  Cervix without lesion/masses, DC wnl  Uterus gravid size (16 wks), NTTP  Adnexa without masses, NTTP    Breasts:   Symmetric, no lesions, masses, rashes, no abnl nipple Dc         ASSESSMENT/PLAN:   32 y.o., , for New OB exam at 15w6d  by d=10wk US  :     1) New OB:   -Pap / STD cxs today    -PN labs   wnl but Rubella not done -- will get today along w/ hgb A1C    -Flu vaccine recommended    -declines Genetics   -RTO 4wks for XIOMARA w/ RENE US     -urine culture          Saint Iha, MD

## 2022-09-27 LAB
EST. AVERAGE GLUCOSE BLD GHB EST-MCNC: 94 MG/DL
HBA1C MFR BLD: 4.9 % (ref 4.8–5.6)

## 2022-09-29 LAB
BACTERIA SPEC CULT: NORMAL
SERVICE CMNT-IMP: NORMAL

## 2022-10-06 LAB
C TRACH RRNA CVX QL NAA+PROBE: NEGATIVE
CYTOLOGIST CVX/VAG CYTO: NORMAL
CYTOLOGY CVX/VAG DOC THIN PREP: NORMAL
HPV REFLEX: NORMAL
Lab: NORMAL
N GONORRHOEA RRNA CVX QL NAA+PROBE: NEGATIVE
PATH REPORT.FINAL DX SPEC: NORMAL
STAT OF ADQ CVX/VAG CYTO-IMP: NORMAL
T VAGINALIS RRNA SPEC QL NAA+PROBE: NEGATIVE

## 2022-11-10 ENCOUNTER — ROUTINE PRENATAL (OUTPATIENT)
Dept: OBGYN CLINIC | Age: 27
End: 2022-11-10
Payer: COMMERCIAL

## 2022-11-10 VITALS — BODY MASS INDEX: 25.61 KG/M2 | WEIGHT: 140 LBS | SYSTOLIC BLOOD PRESSURE: 120 MMHG | DIASTOLIC BLOOD PRESSURE: 70 MMHG

## 2022-11-10 DIAGNOSIS — Z98.891 HISTORY OF CESAREAN DELIVERY: ICD-10-CM

## 2022-11-10 DIAGNOSIS — Z34.90 ENCOUNTER FOR SUPERVISION OF LOW-RISK PREGNANCY, ANTEPARTUM: ICD-10-CM

## 2022-11-10 DIAGNOSIS — Z87.59 HISTORY OF DELIVERY OF MACROSOMAL INFANT: ICD-10-CM

## 2022-11-10 DIAGNOSIS — Z80.9 FAMILY HISTORY OF CANCER: ICD-10-CM

## 2022-11-10 DIAGNOSIS — Z36.89 SCREENING, ANTENATAL, FOR FETAL ANATOMIC SURVEY: Primary | ICD-10-CM

## 2022-11-10 PROCEDURE — 99902 PR PRENATAL VISIT: CPT | Performed by: OBSTETRICS & GYNECOLOGY

## 2022-11-10 PROCEDURE — 76805 OB US >/= 14 WKS SNGL FETUS: CPT | Performed by: OBSTETRICS & GYNECOLOGY

## 2022-11-10 NOTE — PROGRESS NOTES
Fernandez Trinh 22w2d  Denies LOF, VB, Ctxs. Good FM. Genetics: declined     RENE US today:  AC 73%; male  Suboptimal views of spine-- repeat US at . Kathi Harry 144       Has had COVID 19 -- 1/2022 -- very mild   HAS been COVID 19 vaccinated           OB hx:  G1 primary LTCS 11/16/17 w/ Dr Margaret Dorman at 39w5d after FTP/cephalopelvic disproportion (9#3), polyhydramnios \"[place]\". Passed glucola x2.   Treated as A1DM due to fetal size w/ Poly    No hx PreE /GHTN     Desires repeat c/s ___          Hx Macrosomia:   Hgb A1C 4.9    Watch carbs

## 2022-12-21 ENCOUNTER — ROUTINE PRENATAL (OUTPATIENT)
Dept: OBGYN CLINIC | Age: 27
End: 2022-12-21
Payer: COMMERCIAL

## 2022-12-21 ENCOUNTER — OFFICE VISIT (OUTPATIENT)
Dept: OBGYN CLINIC | Age: 27
End: 2022-12-21
Payer: COMMERCIAL

## 2022-12-21 VITALS — HEIGHT: 62 IN | WEIGHT: 147 LBS | BODY MASS INDEX: 27.05 KG/M2

## 2022-12-21 VITALS
HEIGHT: 62 IN | BODY MASS INDEX: 27.05 KG/M2 | SYSTOLIC BLOOD PRESSURE: 114 MMHG | WEIGHT: 147 LBS | DIASTOLIC BLOOD PRESSURE: 58 MMHG

## 2022-12-21 DIAGNOSIS — Z36.2 ENCOUNTER FOR FOLLOW-UP ULTRASOUND OF FETAL ANATOMY: Primary | ICD-10-CM

## 2022-12-21 DIAGNOSIS — Z34.90 ENCOUNTER FOR SUPERVISION OF LOW-RISK PREGNANCY, ANTEPARTUM: Primary | ICD-10-CM

## 2022-12-21 PROBLEM — N83.202 CYST OF LEFT OVARY: Status: RESOLVED | Noted: 2017-04-06 | Resolved: 2022-12-21

## 2022-12-21 PROCEDURE — 76816 OB US FOLLOW-UP PER FETUS: CPT | Performed by: OBSTETRICS & GYNECOLOGY

## 2022-12-21 PROCEDURE — 99902 PR PRENATAL VISIT: CPT | Performed by: OBSTETRICS & GYNECOLOGY

## 2022-12-21 NOTE — PROGRESS NOTES
Rejimonanita Servin  presents for Antionette Padilla [id number]. . 28w1d. PL and MFM notes reviewed as applicable. Taking Prenatal Vitamins: Yes  She is noticing:  no unusual complaints  Was not aware of needing glucola today, will give drink here and will do at their office tomorrow or the next day. Ruchi complete    No problem-specific Assessment & Plan notes found for this encounter.

## 2023-01-09 ENCOUNTER — TELEPHONE (OUTPATIENT)
Dept: OBGYN CLINIC | Age: 28
End: 2023-01-09

## 2023-01-09 ENCOUNTER — ROUTINE PRENATAL (OUTPATIENT)
Dept: OBGYN CLINIC | Age: 28
End: 2023-01-09

## 2023-01-09 VITALS — WEIGHT: 150 LBS | SYSTOLIC BLOOD PRESSURE: 108 MMHG | DIASTOLIC BLOOD PRESSURE: 64 MMHG | BODY MASS INDEX: 27.44 KG/M2

## 2023-01-09 DIAGNOSIS — Z71.85 VACCINE COUNSELING: ICD-10-CM

## 2023-01-09 DIAGNOSIS — Z34.90 ENCOUNTER FOR SUPERVISION OF NORMAL PREGNANCY, ANTEPARTUM, UNSPECIFIED GRAVIDITY: Primary | ICD-10-CM

## 2023-01-09 DIAGNOSIS — Z87.59 HISTORY OF DELIVERY OF MACROSOMAL INFANT: ICD-10-CM

## 2023-01-09 DIAGNOSIS — Z98.891 HISTORY OF CESAREAN DELIVERY: ICD-10-CM

## 2023-01-09 DIAGNOSIS — Z13.0 SCREENING, ANEMIA, DEFICIENCY, IRON: ICD-10-CM

## 2023-01-09 DIAGNOSIS — O36.63X0 EXCESSIVE FETAL GROWTH AFFECTING MANAGEMENT OF PREGNANCY IN THIRD TRIMESTER, SINGLE OR UNSPECIFIED FETUS: ICD-10-CM

## 2023-01-09 PROCEDURE — 99902 PR PRENATAL VISIT: CPT | Performed by: OBSTETRICS & GYNECOLOGY

## 2023-01-09 NOTE — PROGRESS NOTES
XIOMARA.  30w6d  Denies LOF, VB, Ctxs. Good FM. Glucola wnl  No CBC was drawn at that time however; lab not here this early; will get at lab only visit on Friday     Rh pos  Tdap counseling -- states will get at work      Genetics: declined            OB hx:  G1 primary LTCS 17 w/ Dr Randall Dukes at 39w5d after FTP/cephalopelvic disproportion (9#3), polyhydramnios \"[place]\". Passed glucola x2.   Treated as A1DM due to fetal size w/ Poly    No hx PreE /GHTN           Hx Macrosomia (9#3), LGA:    Hgb A1C 4.9  Glucola 86    Watch carbs         US 22:  GP 73%, AC 88%, CLYDE wnl       Repeat growth US in 4wks   Desires repeat c/s -- scheduled today for 7:30 on 3/8/22  w/ me at 39w1d  (form sent)

## 2023-01-09 NOTE — TELEPHONE ENCOUNTER
[facility], Repeat C/S scheduled for 3/8/23 @ 6604 with . Pt instructions sent via [employer]. IOL form faxed to [email or web address].

## 2023-01-13 DIAGNOSIS — Z13.0 SCREENING, ANEMIA, DEFICIENCY, IRON: ICD-10-CM

## 2023-01-13 LAB
ERYTHROCYTE [DISTWIDTH] IN BLOOD BY AUTOMATED COUNT: 13.2 % (ref 11.9–14.6)
HCT VFR BLD AUTO: 29.5 % (ref 35.8–46.3)
HGB BLD-MCNC: 9.9 G/DL (ref 11.7–15.4)
MCH RBC QN AUTO: 29.3 PG (ref 26.1–32.9)
MCHC RBC AUTO-ENTMCNC: 33.6 G/DL (ref 31.4–35)
MCV RBC AUTO: 87.3 FL (ref 82–102)
NRBC # BLD: 0 K/UL (ref 0–0.2)
PLATELET # BLD AUTO: 208 K/UL (ref 150–450)
PMV BLD AUTO: 9.3 FL (ref 9.4–12.3)
RBC # BLD AUTO: 3.38 M/UL (ref 4.05–5.2)
WBC # BLD AUTO: 7.2 K/UL (ref 4.3–11.1)

## 2023-01-16 PROBLEM — O99.019 ANEMIA AFFECTING PREGNANCY: Status: ACTIVE | Noted: 2023-01-16

## 2023-01-16 NOTE — RESULT ENCOUNTER NOTE
hgb low-->FeSO4 325mg BID w/ Vit C 500 BID    Take on empty stomach for optimal absorption. Colace/Miralax for constipation     Recheck Hgb in a few weeks. If no improvement despite medication compliance will warrant further workup/treatment.

## 2023-01-18 RX ORDER — FERROUS SULFATE 325(65) MG
325 TABLET ORAL 2 TIMES DAILY
Qty: 30 TABLET | Refills: 5 | Status: SHIPPED | OUTPATIENT
Start: 2023-01-18

## 2023-01-18 RX ORDER — ASCORBIC ACID 500 MG
500 TABLET ORAL DAILY
Qty: 30 TABLET | Refills: 5 | Status: SHIPPED | OUTPATIENT
Start: 2023-01-18

## 2023-01-25 ENCOUNTER — ROUTINE PRENATAL (OUTPATIENT)
Dept: OBGYN CLINIC | Age: 28
End: 2023-01-25

## 2023-01-25 VITALS — SYSTOLIC BLOOD PRESSURE: 112 MMHG | BODY MASS INDEX: 28.17 KG/M2 | WEIGHT: 154 LBS | DIASTOLIC BLOOD PRESSURE: 72 MMHG

## 2023-01-25 DIAGNOSIS — O99.019 ANEMIA AFFECTING PREGNANCY, ANTEPARTUM: ICD-10-CM

## 2023-01-25 DIAGNOSIS — O36.63X0 EXCESSIVE FETAL GROWTH AFFECTING MANAGEMENT OF PREGNANCY IN THIRD TRIMESTER, SINGLE OR UNSPECIFIED FETUS: ICD-10-CM

## 2023-01-25 DIAGNOSIS — Z98.891 HISTORY OF CESAREAN DELIVERY: ICD-10-CM

## 2023-01-25 DIAGNOSIS — Z34.90 ENCOUNTER FOR SUPERVISION OF NORMAL PREGNANCY, ANTEPARTUM, UNSPECIFIED GRAVIDITY: Primary | ICD-10-CM

## 2023-01-25 DIAGNOSIS — Z80.3 FAMILY HISTORY OF BREAST CANCER: ICD-10-CM

## 2023-01-25 DIAGNOSIS — Z87.59 HISTORY OF DELIVERY OF MACROSOMAL INFANT: ICD-10-CM

## 2023-01-25 PROCEDURE — 99902 PR PRENATAL VISIT: CPT | Performed by: OBSTETRICS & GYNECOLOGY

## 2023-01-25 NOTE — PROGRESS NOTES
XIOMARA.  33w1d  Denies LOF, VB, Ctxs.  Good FM.        S/p Tdap      Genetics: declined              OB hx:  G1 primary LTCS 17 w/ Dr Carpenter at 39w5d after FTP/cephalopelvic disproportion (9#3), polyhydramnios \"[place]\".   Passed glucola x2.  Treated as A1DM due to fetal size w/ Poly    No hx PreE /GHTN           Hx Macrosomia (9#3), LGA:    Hgb A1C 4.9  Glucola 86    Watch carbs         US 22:  GP 73%, AC 88%, CLYDE wnl       Repeat growth US in 2wks (23)    Repeat C/S scheduled for 3/8/23 @ 3642 with  at 39w1d   33 today           Anemia:  hgb 12.1 at preg intake  Hgb 9.9 (23) --Rx FeSO4 325mg BID w/ Vit C 500 BID   Reports taking and no issues currently  Recheck hgb at [place] ___

## 2023-02-07 ENCOUNTER — ROUTINE PRENATAL (OUTPATIENT)
Dept: OBGYN CLINIC | Age: 28
End: 2023-02-07
Payer: COMMERCIAL

## 2023-02-07 VITALS — WEIGHT: 155 LBS | DIASTOLIC BLOOD PRESSURE: 72 MMHG | BODY MASS INDEX: 28.35 KG/M2 | SYSTOLIC BLOOD PRESSURE: 119 MMHG

## 2023-02-07 DIAGNOSIS — O99.019 ANEMIA AFFECTING PREGNANCY, ANTEPARTUM: Primary | ICD-10-CM

## 2023-02-07 DIAGNOSIS — Z34.90 ENCOUNTER FOR SUPERVISION OF NORMAL PREGNANCY, ANTEPARTUM, UNSPECIFIED GRAVIDITY: ICD-10-CM

## 2023-02-07 DIAGNOSIS — O36.63X0 EXCESSIVE FETAL GROWTH AFFECTING MANAGEMENT OF PREGNANCY IN THIRD TRIMESTER, SINGLE OR UNSPECIFIED FETUS: ICD-10-CM

## 2023-02-07 DIAGNOSIS — Z98.891 HISTORY OF CESAREAN DELIVERY: ICD-10-CM

## 2023-02-07 DIAGNOSIS — Z87.59 HISTORY OF DELIVERY OF MACROSOMAL INFANT: ICD-10-CM

## 2023-02-07 LAB
ERYTHROCYTE [DISTWIDTH] IN BLOOD BY AUTOMATED COUNT: 13.6 % (ref 11.9–14.6)
HCT VFR BLD AUTO: 32.5 % (ref 35.8–46.3)
HGB BLD-MCNC: 11 G/DL (ref 11.7–15.4)
MCH RBC QN AUTO: 28.9 PG (ref 26.1–32.9)
MCHC RBC AUTO-ENTMCNC: 33.8 G/DL (ref 31.4–35)
MCV RBC AUTO: 85.3 FL (ref 82–102)
NRBC # BLD: 0 K/UL (ref 0–0.2)
PLATELET # BLD AUTO: 216 K/UL (ref 150–450)
PMV BLD AUTO: 9.5 FL (ref 9.4–12.3)
RBC # BLD AUTO: 3.81 M/UL (ref 4.05–5.2)
WBC # BLD AUTO: 6.9 K/UL (ref 4.3–11.1)

## 2023-02-07 PROCEDURE — 99902 PR PRENATAL VISIT: CPT | Performed by: OBSTETRICS & GYNECOLOGY

## 2023-02-07 PROCEDURE — 76816 OB US FOLLOW-UP PER FETUS: CPT | Performed by: OBSTETRICS & GYNECOLOGY

## 2023-02-07 NOTE — PROGRESS NOTES
Keisha Ramos 35w0d  Denies LOF, VB, Ctxs. Good FM. GBS at [place]        S/p Tdap   Genetics: declined            OB hx:  G1 primary LTCS 11/16/17 w/ Dr Shane Terry at 39w5d after FTP/cephalopelvic disproportion (9#3), polyhydramnios \"[place]\". Passed glucola x2.   Treated as A1DM due to fetal size w/ Poly    No hx PreE /GHTN           Hx Macrosomia (9#3), LGA:    Hgb A1C 4.9  Glucola 86    Watch carbs         US 12/21/22:  GP 73%, AC 88%, CLYDE wnl     Repeat Growth US today:  GP 81% (6#14), AC >99%, CLYDE 18  BPP 8/8    Cep        Repeat C/S scheduled for 3/8/23 @ 5030 with  at 39w1d  Fh 33 today           Anemia:  hgb 12.1 at preg intake  Hgb 9.9 (1/13/23) --Rx FeSO4 325mg BID w/ Vit C 500 BID   Reports taking and no issues currently  Recheck hgb today w/ iron panel ___

## 2023-02-08 LAB
FERRITIN SERPL-MCNC: 15 NG/ML (ref 8–388)
IRON SERPL-MCNC: 38 UG/DL (ref 35–150)
TIBC SERPL-MCNC: 466 UG/DL (ref 250–450)
TRANSFERRIN SERPL-MCNC: 381 MG/DL (ref 202–364)

## 2023-02-08 NOTE — RESULT ENCOUNTER NOTE
Iron Panel still consistent w/ iron deficiency anemia but hgb improved and pt tolerating iron/vit C BID PO currently. Continue to take  FeSO4 325mg BID w/ Vit C 500 BID  for rest of pregnancy  (Send refills if pt needs)      Take on empty stomach for optimal absorption. Colace/Miralax for constipation     Recheck Hgb in a few weeks. If no improvement despite medication compliance will warrant further workup/treatment.

## 2023-02-10 LAB
HGB A MFR BLD: 97.8 % (ref 96.4–98.8)
HGB A2 MFR BLD COLUMN CHROM: 2.2 % (ref 1.8–3.2)
HGB F MFR BLD: 0 % (ref 0–2)
HGB FRACT BLD-IMP: NORMAL
HGB S MFR BLD: 0 %

## 2023-02-13 NOTE — PROGRESS NOTES
Roman Nelson. 36w0d  Denies LOF, VB, Ctxs. Good FM. GBS today  SVE: 1/25/-3   ceph by roscoe          S/p Tdap   Genetics: declined            OB hx:  G1 primary LTCS 11/16/17 w/ Dr Jimmy Ramos at 39w5d after FTP/cephalopelvic disproportion (9#3), polyhydramnios \"[place]\". Passed glucola x2.   Treated as A1DM due to fetal size w/ Poly    No hx PreE /GHTN           Hx Macrosomia (9#3), LGA:    Hgb A1C 4.9  Glucola 86    Watch carbs         US 12/21/22:  GP 73%, AC 88%, CLYDE wnl     Last growth US  2/7/23: GP 81% (6#14), AC >99%, CLYDE 18  BPP 8/8    Ceph     Repeat growht US in 2wks     Repeat C/S scheduled for 3/8/23 @ 0730 with  at 39w1d  Fh 33 today           Anemia:  hgb 12.1 at preg intake  Hgb 9.9 (1/13/23) --Rx FeSO4 325mg BID w/ Vit C 500 BID   Reports taking and no issues currently  Hemoglobin electrophoresis wnl  Hgb 11.0 (2/7/23)  Iron panel (2/7/23) c/w JANIS but hgb improved and patient tolerating iron/vit C BID

## 2023-02-14 ENCOUNTER — ROUTINE PRENATAL (OUTPATIENT)
Dept: OBGYN CLINIC | Age: 28
End: 2023-02-14

## 2023-02-14 VITALS — DIASTOLIC BLOOD PRESSURE: 72 MMHG | BODY MASS INDEX: 28.53 KG/M2 | SYSTOLIC BLOOD PRESSURE: 114 MMHG | WEIGHT: 156 LBS

## 2023-02-14 DIAGNOSIS — Z34.90 ENCOUNTER FOR SUPERVISION OF NORMAL PREGNANCY, ANTEPARTUM, UNSPECIFIED GRAVIDITY: Primary | ICD-10-CM

## 2023-02-14 DIAGNOSIS — Z36.85 SCREENING, ANTENATAL, FOR STREPTOCOCCUS B: ICD-10-CM

## 2023-02-14 DIAGNOSIS — O36.63X0 EXCESSIVE FETAL GROWTH AFFECTING MANAGEMENT OF PREGNANCY IN THIRD TRIMESTER, SINGLE OR UNSPECIFIED FETUS: ICD-10-CM

## 2023-02-14 DIAGNOSIS — Z87.59 HISTORY OF DELIVERY OF MACROSOMAL INFANT: ICD-10-CM

## 2023-02-14 DIAGNOSIS — O99.019 ANEMIA AFFECTING PREGNANCY, ANTEPARTUM: ICD-10-CM

## 2023-02-14 DIAGNOSIS — Z98.891 HISTORY OF CESAREAN DELIVERY: ICD-10-CM

## 2023-02-14 PROCEDURE — 99902 PR PRENATAL VISIT: CPT | Performed by: OBSTETRICS & GYNECOLOGY

## 2023-02-17 LAB
BACTERIA SPEC CULT: ABNORMAL
SERVICE CMNT-IMP: ABNORMAL

## 2023-02-23 ENCOUNTER — ROUTINE PRENATAL (OUTPATIENT)
Dept: OBGYN CLINIC | Age: 28
End: 2023-02-23

## 2023-02-23 VITALS — SYSTOLIC BLOOD PRESSURE: 116 MMHG | BODY MASS INDEX: 28.72 KG/M2 | DIASTOLIC BLOOD PRESSURE: 68 MMHG | WEIGHT: 157 LBS

## 2023-02-23 DIAGNOSIS — Z98.891 HISTORY OF CESAREAN DELIVERY: ICD-10-CM

## 2023-02-23 DIAGNOSIS — Z87.59 HISTORY OF DELIVERY OF MACROSOMAL INFANT: ICD-10-CM

## 2023-02-23 DIAGNOSIS — Z34.90 ENCOUNTER FOR SUPERVISION OF NORMAL PREGNANCY, ANTEPARTUM, UNSPECIFIED GRAVIDITY: Primary | ICD-10-CM

## 2023-02-23 DIAGNOSIS — O99.019 ANEMIA AFFECTING PREGNANCY, ANTEPARTUM: ICD-10-CM

## 2023-02-23 PROCEDURE — 99902 PR PRENATAL VISIT: CPT | Performed by: OBSTETRICS & GYNECOLOGY

## 2023-02-23 SDOH — ECONOMIC STABILITY: FOOD INSECURITY: WITHIN THE PAST 12 MONTHS, THE FOOD YOU BOUGHT JUST DIDN'T LAST AND YOU DIDN'T HAVE MONEY TO GET MORE.: NEVER TRUE

## 2023-02-23 SDOH — ECONOMIC STABILITY: FOOD INSECURITY: WITHIN THE PAST 12 MONTHS, YOU WORRIED THAT YOUR FOOD WOULD RUN OUT BEFORE YOU GOT MONEY TO BUY MORE.: NEVER TRUE

## 2023-02-23 SDOH — ECONOMIC STABILITY: INCOME INSECURITY: HOW HARD IS IT FOR YOU TO PAY FOR THE VERY BASICS LIKE FOOD, HOUSING, MEDICAL CARE, AND HEATING?: NOT VERY HARD

## 2023-02-23 SDOH — ECONOMIC STABILITY: HOUSING INSECURITY
IN THE LAST 12 MONTHS, WAS THERE A TIME WHEN YOU DID NOT HAVE A STEADY PLACE TO SLEEP OR SLEPT IN A SHELTER (INCLUDING NOW)?: NO

## 2023-02-23 NOTE — PROGRESS NOTES
XIOMARA.  37w2d  Denies LOF, VB.  +irreg ctxs-- approx 1 every 1-2hrs  . Good FM. Lots of back pain/pressure  States having trouble being able to sit still at work due to the discomfort  Note given for out of work for rest of pregnancy        GBS pos -- PCN in labor  SVE: 1.5/50/-3   ceph by roscoe          S/p Tdap             OB hx:  G1 primary LTCS /17 w/ Dr Pricila Green at 39w5d after FTP/cephalopelvic disproportion (9#3), polyhydramnios \"[place]\". Passed glucola x2.   Treated as A1DM due to fetal size w/ Poly    No hx PreE /GHTN           Hx Macrosomia (9#3), LGA:    Hgb A1C 4.9  Glucola 86    Watch carbs         US 22:  GP 73%, AC 88%, CLYDE wnl     Last growth US  23: GP 81% (6#14), AC >99%, CLYDE 18  BPP     Ceph     Repeat growht US next week    Repeat C/S scheduled for 3/8/23 @ 0730 with  at 39w1d  Fh 33 today           Anemia:  hgb 12.1 at preg intake  Hgb 9.9 (23) --Rx FeSO4 325mg BID w/ Vit C 500 BID   Reports taking and no issues currently  Hemoglobin electrophoresis wnl  Hgb 11.0 (23)  Iron panel (23) c/w JANIS but hgb improved and patient tolerating iron/vit C BID

## 2023-02-23 NOTE — LETTER
[address] [address]  Phone: [phone]  Fax: [phone]    Carin Torrez MD        February 23, 2023     Patient: Tia Strauss   YOB: 1995   Date of Visit: 2/23/2023       To Whom It May Concern: It is my medical opinion that Steve Holman should remain out of work until further notice due to pregnancy complications. If you have any questions or concerns, please don't hesitate to call.     Sincerely,        Carin Torrez MD

## 2023-03-01 ENCOUNTER — ROUTINE PRENATAL (OUTPATIENT)
Dept: OBGYN CLINIC | Age: 28
End: 2023-03-01
Payer: COMMERCIAL

## 2023-03-01 VITALS — WEIGHT: 161 LBS | BODY MASS INDEX: 29.45 KG/M2 | DIASTOLIC BLOOD PRESSURE: 63 MMHG | SYSTOLIC BLOOD PRESSURE: 116 MMHG

## 2023-03-01 DIAGNOSIS — Z34.90 ENCOUNTER FOR SUPERVISION OF NORMAL PREGNANCY, ANTEPARTUM, UNSPECIFIED GRAVIDITY: Primary | ICD-10-CM

## 2023-03-01 DIAGNOSIS — Z87.59 HISTORY OF DELIVERY OF MACROSOMAL INFANT: ICD-10-CM

## 2023-03-01 DIAGNOSIS — O99.019 ANEMIA AFFECTING PREGNANCY, ANTEPARTUM: ICD-10-CM

## 2023-03-01 DIAGNOSIS — O36.63X0 EXCESSIVE FETAL GROWTH AFFECTING MANAGEMENT OF PREGNANCY IN THIRD TRIMESTER, SINGLE OR UNSPECIFIED FETUS: ICD-10-CM

## 2023-03-01 DIAGNOSIS — Z98.891 HISTORY OF CESAREAN DELIVERY: ICD-10-CM

## 2023-03-01 PROCEDURE — 76816 OB US FOLLOW-UP PER FETUS: CPT | Performed by: OBSTETRICS & GYNECOLOGY

## 2023-03-01 PROCEDURE — 99902 PR PRENATAL VISIT: CPT | Performed by: OBSTETRICS & GYNECOLOGY

## 2023-03-01 NOTE — PROGRESS NOTES
Tina Hughes 38w1d  Denies LOF, VB, RUCs.  +FM. Last visit c/o lots of back pain/pressure  States having trouble being able to sit still at work due to the discomfort  Note given for out of work for rest of pregnancy        GBS pos  --PCN if labors   SVE: 1.5/50/-3     ceph by roscoe           S/p Tdap             OB hx:  G1 primary LTCS 11/16/17 w/ Dr Jennie Emery at 39w5d after FTP/cephalopelvic disproportion (9#3), polyhydramnios \"[place]\". Passed glucola x2.   Treated as A1DM due to fetal size w/ Poly    No hx PreE /GHTN           Hx Macrosomia (9#3), LGA:    Hgb A1C 4.9  Glucola 86    Watch carbs         US 12/21/22:  GP 73%, AC 88%, CLYDE wnl     Last growth US  2/7/23: GP 81% (6#14), AC >99%, CLYDE 18  BPP 8/8    Ceph       Growth US today:   GP 91% (8#10), AC >99%, CLYDE 17.4   BPP 8/8   Ceph     Repeat C/S scheduled for 3/8/23 @ 0730 with  at 39w1d       Anemia:  hgb 12.1 at preg intake  Hgb 9.9 (1/13/23) --Rx FeSO4 325mg BID w/ Vit C 500 BID   Reports taking and no issues currently  Hemoglobin electrophoresis wnl  Hgb 11.0 (2/7/23)  Iron panel (2/7/23) c/w JANIS but hgb improved and patient tolerating iron/vit C BID

## 2023-03-07 ENCOUNTER — ANESTHESIA EVENT (OUTPATIENT)
Dept: OPERATING ROOM | Age: 28
End: 2023-03-07
Payer: COMMERCIAL

## 2023-03-08 ENCOUNTER — HOSPITAL ENCOUNTER (INPATIENT)
Age: 28
LOS: 2 days | Discharge: HOME | End: 2023-03-10
Attending: OBSTETRICS & GYNECOLOGY | Admitting: OBSTETRICS & GYNECOLOGY
Payer: COMMERCIAL

## 2023-03-08 ENCOUNTER — ANESTHESIA (OUTPATIENT)
Dept: OPERATING ROOM | Age: 28
End: 2023-03-08
Payer: COMMERCIAL

## 2023-03-08 DIAGNOSIS — Z98.891 STATUS POST CESAREAN SECTION: Primary | ICD-10-CM

## 2023-03-08 PROBLEM — Z3A.39 39 WEEKS GESTATION OF PREGNANCY: Status: ACTIVE | Noted: 2023-03-08

## 2023-03-08 LAB
ABO + RH BLD: NORMAL
BLOOD GROUP ANTIBODIES SERPL: NORMAL
ERYTHROCYTE [DISTWIDTH] IN BLOOD BY AUTOMATED COUNT: 12.7 % (ref 11.9–14.6)
HCT VFR BLD AUTO: 32.4 % (ref 35.8–46.3)
HGB BLD-MCNC: 10.6 G/DL (ref 11.7–15.4)
MCH RBC QN AUTO: 26.8 PG (ref 26.1–32.9)
MCHC RBC AUTO-ENTMCNC: 32.7 G/DL (ref 31.4–35)
MCV RBC AUTO: 81.8 FL (ref 82–102)
NRBC # BLD: 0 K/UL (ref 0–0.2)
PLATELET # BLD AUTO: 197 K/UL (ref 150–450)
PMV BLD AUTO: 9.7 FL (ref 9.4–12.3)
RBC # BLD AUTO: 3.96 M/UL (ref 4.05–5.2)
SPECIMEN EXP DATE BLD: NORMAL
WBC # BLD AUTO: 6.8 K/UL (ref 4.3–11.1)

## 2023-03-08 PROCEDURE — 6360000002 HC RX W HCPCS: Performed by: ANESTHESIOLOGY

## 2023-03-08 PROCEDURE — 2709999900 HC NON-CHARGEABLE SUPPLY: Performed by: OBSTETRICS & GYNECOLOGY

## 2023-03-08 PROCEDURE — 2500000003 HC RX 250 WO HCPCS: Performed by: ANESTHESIOLOGY

## 2023-03-08 PROCEDURE — 6360000002 HC RX W HCPCS: Performed by: OBSTETRICS & GYNECOLOGY

## 2023-03-08 PROCEDURE — 2580000003 HC RX 258: Performed by: OBSTETRICS & GYNECOLOGY

## 2023-03-08 PROCEDURE — 2500000003 HC RX 250 WO HCPCS: Performed by: STUDENT IN AN ORGANIZED HEALTH CARE EDUCATION/TRAINING PROGRAM

## 2023-03-08 PROCEDURE — 7100000001 HC PACU RECOVERY - ADDTL 15 MIN: Performed by: OBSTETRICS & GYNECOLOGY

## 2023-03-08 PROCEDURE — 3700000000 HC ANESTHESIA ATTENDED CARE: Performed by: OBSTETRICS & GYNECOLOGY

## 2023-03-08 PROCEDURE — 1100000000 HC RM PRIVATE

## 2023-03-08 PROCEDURE — 6370000000 HC RX 637 (ALT 250 FOR IP): Performed by: ANESTHESIOLOGY

## 2023-03-08 PROCEDURE — 59510 CESAREAN DELIVERY: CPT | Performed by: OBSTETRICS & GYNECOLOGY

## 2023-03-08 PROCEDURE — 85027 COMPLETE CBC AUTOMATED: CPT

## 2023-03-08 PROCEDURE — 6360000002 HC RX W HCPCS: Performed by: STUDENT IN AN ORGANIZED HEALTH CARE EDUCATION/TRAINING PROGRAM

## 2023-03-08 PROCEDURE — 2580000003 HC RX 258: Performed by: ANESTHESIOLOGY

## 2023-03-08 PROCEDURE — 86900 BLOOD TYPING SEROLOGIC ABO: CPT

## 2023-03-08 PROCEDURE — 2580000003 HC RX 258: Performed by: STUDENT IN AN ORGANIZED HEALTH CARE EDUCATION/TRAINING PROGRAM

## 2023-03-08 PROCEDURE — A4216 STERILE WATER/SALINE, 10 ML: HCPCS | Performed by: ANESTHESIOLOGY

## 2023-03-08 PROCEDURE — 7100000000 HC PACU RECOVERY - FIRST 15 MIN: Performed by: OBSTETRICS & GYNECOLOGY

## 2023-03-08 PROCEDURE — 2500000003 HC RX 250 WO HCPCS: Performed by: OBSTETRICS & GYNECOLOGY

## 2023-03-08 PROCEDURE — 3609079900 HC CESAREAN SECTION: Performed by: OBSTETRICS & GYNECOLOGY

## 2023-03-08 PROCEDURE — 3700000001 HC ADD 15 MINUTES (ANESTHESIA): Performed by: OBSTETRICS & GYNECOLOGY

## 2023-03-08 PROCEDURE — 4A1HXCZ MONITORING OF PRODUCTS OF CONCEPTION, CARDIAC RATE, EXTERNAL APPROACH: ICD-10-PCS | Performed by: OBSTETRICS & GYNECOLOGY

## 2023-03-08 RX ORDER — SODIUM CHLORIDE 0.9 % (FLUSH) 0.9 %
5-40 SYRINGE (ML) INJECTION EVERY 12 HOURS SCHEDULED
Status: DISCONTINUED | OUTPATIENT
Start: 2023-03-08 | End: 2023-03-08 | Stop reason: HOSPADM

## 2023-03-08 RX ORDER — NALBUPHINE HCL 10 MG/ML
5 AMPUL (ML) INJECTION EVERY 4 HOURS PRN
Status: DISCONTINUED | OUTPATIENT
Start: 2023-03-08 | End: 2023-03-09

## 2023-03-08 RX ORDER — ACETAMINOPHEN 325 MG/1
650 TABLET ORAL EVERY 6 HOURS PRN
Status: DISCONTINUED | OUTPATIENT
Start: 2023-03-08 | End: 2023-03-09

## 2023-03-08 RX ORDER — SODIUM CHLORIDE 0.9 % (FLUSH) 0.9 %
5-40 SYRINGE (ML) INJECTION EVERY 12 HOURS SCHEDULED
Status: DISCONTINUED | OUTPATIENT
Start: 2023-03-08 | End: 2023-03-10 | Stop reason: HOSPADM

## 2023-03-08 RX ORDER — SODIUM CHLORIDE, SODIUM LACTATE, POTASSIUM CHLORIDE, CALCIUM CHLORIDE 600; 310; 30; 20 MG/100ML; MG/100ML; MG/100ML; MG/100ML
INJECTION, SOLUTION INTRAVENOUS CONTINUOUS
Status: DISCONTINUED | OUTPATIENT
Start: 2023-03-08 | End: 2023-03-10 | Stop reason: HOSPADM

## 2023-03-08 RX ORDER — SODIUM CHLORIDE 0.9 % (FLUSH) 0.9 %
10 SYRINGE (ML) INJECTION EVERY 12 HOURS SCHEDULED
Status: DISCONTINUED | OUTPATIENT
Start: 2023-03-08 | End: 2023-03-08

## 2023-03-08 RX ORDER — PHENYLEPHRINE HYDROCHLORIDE 10 MG/ML
INJECTION INTRAVENOUS PRN
Status: DISCONTINUED | OUTPATIENT
Start: 2023-03-08 | End: 2023-03-08 | Stop reason: SDUPTHER

## 2023-03-08 RX ORDER — LANOLIN
CREAM (ML) TOPICAL
Status: DISCONTINUED | OUTPATIENT
Start: 2023-03-08 | End: 2023-03-10 | Stop reason: HOSPADM

## 2023-03-08 RX ORDER — SODIUM CHLORIDE 9 MG/ML
INJECTION, SOLUTION INTRAVENOUS PRN
Status: DISCONTINUED | OUTPATIENT
Start: 2023-03-08 | End: 2023-03-08

## 2023-03-08 RX ORDER — SODIUM CHLORIDE 9 MG/ML
INJECTION, SOLUTION INTRAVENOUS PRN
Status: DISCONTINUED | OUTPATIENT
Start: 2023-03-08 | End: 2023-03-08 | Stop reason: HOSPADM

## 2023-03-08 RX ORDER — ONDANSETRON 2 MG/ML
INJECTION INTRAMUSCULAR; INTRAVENOUS PRN
Status: DISCONTINUED | OUTPATIENT
Start: 2023-03-08 | End: 2023-03-08 | Stop reason: SDUPTHER

## 2023-03-08 RX ORDER — TRISODIUM CITRATE DIHYDRATE AND CITRIC ACID MONOHYDRATE 500; 334 MG/5ML; MG/5ML
30 SOLUTION ORAL ONCE
Status: COMPLETED | OUTPATIENT
Start: 2023-03-08 | End: 2023-03-08

## 2023-03-08 RX ORDER — SODIUM CHLORIDE 0.9 % (FLUSH) 0.9 %
5-40 SYRINGE (ML) INJECTION PRN
Status: DISCONTINUED | OUTPATIENT
Start: 2023-03-08 | End: 2023-03-08 | Stop reason: HOSPADM

## 2023-03-08 RX ORDER — SODIUM CHLORIDE 0.9 % (FLUSH) 0.9 %
5-40 SYRINGE (ML) INJECTION PRN
Status: DISCONTINUED | OUTPATIENT
Start: 2023-03-08 | End: 2023-03-10 | Stop reason: HOSPADM

## 2023-03-08 RX ORDER — CLINDAMYCIN PHOSPHATE 900 MG/50ML
900 INJECTION INTRAVENOUS ONCE
Status: COMPLETED | OUTPATIENT
Start: 2023-03-08 | End: 2023-03-08

## 2023-03-08 RX ORDER — HYDROMORPHONE HYDROCHLORIDE 1 MG/ML
0.5 INJECTION, SOLUTION INTRAMUSCULAR; INTRAVENOUS; SUBCUTANEOUS EVERY 6 HOURS PRN
Status: DISCONTINUED | OUTPATIENT
Start: 2023-03-08 | End: 2023-03-09

## 2023-03-08 RX ORDER — KETOROLAC TROMETHAMINE 30 MG/ML
30 INJECTION, SOLUTION INTRAMUSCULAR; INTRAVENOUS EVERY 6 HOURS PRN
Status: DISCONTINUED | OUTPATIENT
Start: 2023-03-08 | End: 2023-03-09

## 2023-03-08 RX ORDER — HYDROMORPHONE HYDROCHLORIDE 1 MG/ML
0.25 INJECTION, SOLUTION INTRAMUSCULAR; INTRAVENOUS; SUBCUTANEOUS EVERY 6 HOURS PRN
Status: DISCONTINUED | OUTPATIENT
Start: 2023-03-08 | End: 2023-03-09

## 2023-03-08 RX ORDER — SODIUM CHLORIDE 0.9 % (FLUSH) 0.9 %
10 SYRINGE (ML) INJECTION PRN
Status: DISCONTINUED | OUTPATIENT
Start: 2023-03-08 | End: 2023-03-08

## 2023-03-08 RX ORDER — BUPIVACAINE HYDROCHLORIDE 7.5 MG/ML
INJECTION, SOLUTION INTRASPINAL
Status: COMPLETED | OUTPATIENT
Start: 2023-03-08 | End: 2023-03-08

## 2023-03-08 RX ORDER — ONDANSETRON 2 MG/ML
4 INJECTION INTRAMUSCULAR; INTRAVENOUS EVERY 6 HOURS PRN
Status: DISCONTINUED | OUTPATIENT
Start: 2023-03-08 | End: 2023-03-09

## 2023-03-08 RX ORDER — PHENYLEPHRINE HYDROCHLORIDE 10 MG/ML
INJECTION INTRAVENOUS PRN
Status: DISCONTINUED | OUTPATIENT
Start: 2023-03-08 | End: 2023-03-08

## 2023-03-08 RX ORDER — SODIUM CHLORIDE, SODIUM LACTATE, POTASSIUM CHLORIDE, AND CALCIUM CHLORIDE .6; .31; .03; .02 G/100ML; G/100ML; G/100ML; G/100ML
1000 INJECTION, SOLUTION INTRAVENOUS ONCE
Status: DISCONTINUED | OUTPATIENT
Start: 2023-03-08 | End: 2023-03-08

## 2023-03-08 RX ORDER — TRANEXAMIC ACID 10 MG/ML
INJECTION, SOLUTION INTRAVENOUS PRN
Status: DISCONTINUED | OUTPATIENT
Start: 2023-03-08 | End: 2023-03-08 | Stop reason: SDUPTHER

## 2023-03-08 RX ORDER — MORPHINE SULFATE 0.5 MG/ML
INJECTION, SOLUTION EPIDURAL; INTRATHECAL; INTRAVENOUS
Status: COMPLETED | OUTPATIENT
Start: 2023-03-08 | End: 2023-03-08

## 2023-03-08 RX ORDER — ALBUTEROL SULFATE 2.5 MG/3ML
2.5 SOLUTION RESPIRATORY (INHALATION)
Status: DISCONTINUED | OUTPATIENT
Start: 2023-03-08 | End: 2023-03-08 | Stop reason: HOSPADM

## 2023-03-08 RX ORDER — GENTAMICIN SULFATE 60 MG/50ML
120 INJECTION, SOLUTION INTRAVENOUS ONCE
Status: COMPLETED | OUTPATIENT
Start: 2023-03-08 | End: 2023-03-08

## 2023-03-08 RX ORDER — NALOXONE HYDROCHLORIDE 0.4 MG/ML
INJECTION, SOLUTION INTRAMUSCULAR; INTRAVENOUS; SUBCUTANEOUS PRN
Status: ACTIVE | OUTPATIENT
Start: 2023-03-08 | End: 2023-03-09

## 2023-03-08 RX ORDER — OXYCODONE HYDROCHLORIDE 5 MG/1
5 TABLET ORAL EVERY 4 HOURS PRN
Status: DISCONTINUED | OUTPATIENT
Start: 2023-03-08 | End: 2023-03-09

## 2023-03-08 RX ORDER — LIDOCAINE HYDROCHLORIDE 10 MG/ML
1 INJECTION, SOLUTION INFILTRATION; PERINEURAL
Status: DISCONTINUED | OUTPATIENT
Start: 2023-03-08 | End: 2023-03-08 | Stop reason: HOSPADM

## 2023-03-08 RX ORDER — KETOROLAC TROMETHAMINE 30 MG/ML
INJECTION, SOLUTION INTRAMUSCULAR; INTRAVENOUS PRN
Status: DISCONTINUED | OUTPATIENT
Start: 2023-03-08 | End: 2023-03-08 | Stop reason: SDUPTHER

## 2023-03-08 RX ORDER — SODIUM CHLORIDE 9 MG/ML
INJECTION, SOLUTION INTRAVENOUS PRN
Status: DISCONTINUED | OUTPATIENT
Start: 2023-03-08 | End: 2023-03-10 | Stop reason: HOSPADM

## 2023-03-08 RX ORDER — TRANEXAMIC ACID 10 MG/ML
INJECTION, SOLUTION INTRAVENOUS PRN
Status: DISCONTINUED | OUTPATIENT
Start: 2023-03-08 | End: 2023-03-08

## 2023-03-08 RX ORDER — SODIUM CHLORIDE, SODIUM LACTATE, POTASSIUM CHLORIDE, CALCIUM CHLORIDE 600; 310; 30; 20 MG/100ML; MG/100ML; MG/100ML; MG/100ML
INJECTION, SOLUTION INTRAVENOUS CONTINUOUS
Status: DISCONTINUED | OUTPATIENT
Start: 2023-03-08 | End: 2023-03-08 | Stop reason: HOSPADM

## 2023-03-08 RX ADMIN — SODIUM CHLORIDE, POTASSIUM CHLORIDE, SODIUM LACTATE AND CALCIUM CHLORIDE: 600; 310; 30; 20 INJECTION, SOLUTION INTRAVENOUS at 15:15

## 2023-03-08 RX ADMIN — PHENYLEPHRINE HYDROCHLORIDE 25 MCG/MIN: 10 INJECTION INTRAVENOUS at 07:24

## 2023-03-08 RX ADMIN — FAMOTIDINE 20 MG: 10 INJECTION INTRAVENOUS at 06:30

## 2023-03-08 RX ADMIN — SODIUM CITRATE AND CITRIC ACID MONOHYDRATE 30 ML: 500; 334 SOLUTION ORAL at 06:30

## 2023-03-08 RX ADMIN — SODIUM CHLORIDE, SODIUM LACTATE, POTASSIUM CHLORIDE, AND CALCIUM CHLORIDE: 600; 310; 30; 20 INJECTION, SOLUTION INTRAVENOUS at 07:10

## 2023-03-08 RX ADMIN — ONDANSETRON 4 MG: 2 INJECTION INTRAMUSCULAR; INTRAVENOUS at 07:43

## 2023-03-08 RX ADMIN — CLINDAMYCIN PHOSPHATE 900 MG: 900 INJECTION, SOLUTION INTRAVENOUS at 06:52

## 2023-03-08 RX ADMIN — ACETAMINOPHEN 650 MG: 325 TABLET, FILM COATED ORAL at 12:46

## 2023-03-08 RX ADMIN — GENTAMICIN SULFATE 120 MG: 60 INJECTION, SOLUTION INTRAVENOUS at 06:33

## 2023-03-08 RX ADMIN — Medication 500 ML/HR: at 07:41

## 2023-03-08 RX ADMIN — TRANEXAMIC ACID 1000 MG: 10 INJECTION, SOLUTION INTRAVENOUS at 07:41

## 2023-03-08 RX ADMIN — MORPHINE SULFATE 0.15 MG: 0.5 INJECTION, SOLUTION EPIDURAL; INTRATHECAL; INTRAVENOUS at 07:18

## 2023-03-08 RX ADMIN — KETOROLAC TROMETHAMINE 30 MG: 30 INJECTION, SOLUTION INTRAMUSCULAR; INTRAVENOUS at 08:09

## 2023-03-08 RX ADMIN — KETOROLAC TROMETHAMINE 30 MG: 30 INJECTION, SOLUTION INTRAMUSCULAR; INTRAVENOUS at 20:35

## 2023-03-08 RX ADMIN — BUPIVACAINE HYDROCHLORIDE IN DEXTROSE 12 MG: 7.5 INJECTION, SOLUTION SUBARACHNOID at 07:18

## 2023-03-08 RX ADMIN — PHENYLEPHRINE HYDROCHLORIDE 100 MCG: 10 INJECTION INTRAVENOUS at 08:11

## 2023-03-08 RX ADMIN — SODIUM CHLORIDE, SODIUM LACTATE, POTASSIUM CHLORIDE, AND CALCIUM CHLORIDE: 600; 310; 30; 20 INJECTION, SOLUTION INTRAVENOUS at 07:19

## 2023-03-08 ASSESSMENT — PAIN DESCRIPTION - LOCATION
LOCATION: ABDOMEN
LOCATION: ABDOMEN;INCISION

## 2023-03-08 ASSESSMENT — PAIN - FUNCTIONAL ASSESSMENT: PAIN_FUNCTIONAL_ASSESSMENT: ACTIVITIES ARE NOT PREVENTED

## 2023-03-08 ASSESSMENT — PAIN DESCRIPTION - DESCRIPTORS: DESCRIPTORS: ACHING

## 2023-03-08 ASSESSMENT — PAIN SCALES - GENERAL
PAINLEVEL_OUTOF10: 1
PAINLEVEL_OUTOF10: 5
PAINLEVEL_OUTOF10: 2

## 2023-03-08 ASSESSMENT — PAIN DESCRIPTION - ORIENTATION: ORIENTATION: LOWER

## 2023-03-08 NOTE — PROGRESS NOTES
SBAR OUT Report: Mother    Verbal report given to Ariel Leger RN on this patient, who is now being transferred to [address] [place] [address] (unit) for routine progression of patient care. The patient is  wearing a green \"Anesthesia-Duramorph\" band. Report consisted of patient's Situation, Background, Assessment and Recommendations (SBAR). [place] ID bands were compared with the identification form, and verified with the patient and receiving nurse. Information from the Nurse Handoff Report, Adult Overview, Surgery Report, Intake/Output, MAR, and Recent Results and the [place] Report was reviewed with the receiving nurse; opportunity for questions and clarification provided.

## 2023-03-08 NOTE — OP NOTE
Section Delivery Operative Report       Patient: Adrian Nunn MRN: [id number]  SSN: [id number]    YOB: 1995  Age: 29 y.o. Sex: female       Date of Procedure:  3/8/2023    Preoperative Diagnosis:   28 yo   at 39w1d w/ hx of c/s x1, LGA baby, declines TOLAC. Postoperative Diagnosis:  ADAN    Procedure:   Repeat LTCS     Surgeon(s):  Nuris Shore MD    Indication: as above    Anesthesia: Spinal    Prophylactic Antibiotics:  Gent/Clinda     QBL per RN     Information for the patient's :  Ena Mcgraw [[id number]]   @[id number]@      Specimens: * No specimens in log *            Complications:  none     Findings: VMI at 0740 on 3/8/22, APGARs 9, 9. Mild adhesions of rectus mm to fascia and rectus mm together at midline. Moderate adhesions of bladder to LISBET. Normal appearing uterus, tubes, ovaries. Procedure Details:    Patient was taken to the OR after informed consent had been obtained. After anesthesia was found to be adequate, she was then placed in a dorsal supine position with a left lateral tilt. She was then prepped and draped in a sterile fashion. Time out was completed. Attention was turned to the abdomen and an Allis test confirmed adequate anesthesia. A Pfannenstiel skin incision was made with the scalpel down to the fascia. The fascia was knicked in the midline. The incision was extended laterally using farrar scissors. The cephalad fascia was grasped with kochers and the rectus muscles  off both bluntly and sharply. The process was repeated on the inferior fascia. Rectus muscles were  bluntly. The peritoneum was entered using Metzenbaum scissors and the incision was extended laterally bluntly. The bladder blade was inserted. A  low transverse  semi lunar transverse incision was made on the uterus in the midline. The uterine incision was extended laterally bluntly.       The fetus was delivered Called and relayed to Patient    ----- Message from Colt Rodriguez MD sent at 4/2/2020 10:05 AM CDT -----  Tell him it is done and I decreased his dosage to 3 times a day instead of 4  ----- Message -----  From: Funmi Byers  Sent: 4/2/2020   9:29 AM CDT  To: Colt Rodriguez MD    Patient stated that yes he is out of the eardrops and thanks you for filling them for him.       ----- Message -----  From: Colt Rodriguez MD  Sent: 4/2/2020   9:15 AM CDT  To: Funmi Byers    Ask him if he is out of his eardrops.  That would be the only thing that he would need a refill on  ----- Message -----  From: Funmi Byers  Sent: 4/2/2020   9:08 AM CDT  To: Colt Rodriguez MD    Patient called and stated that he called Linda Gates's office about refills on his medication. Her office told him that since he saw you Monday he would have to contact you about refills on  Cortisporin 3.3-3-10-05mg  4x daily  Keflex 500mg 1 pill 3x daily  Valtrext 1000 mg  1 pill 3x daily    Pharmacy is [facility] in [place].     Please advise if you will refill or not so that I can call him back. Thanks!           from a vertex  position through the uterine incision. Naso and oropharynx were bulb suctioned. The cord was doubly clamped and transected. The infant was handed off to the awaiting Rns . The placenta was removed by fundal massage and traction on the cord. The uterus was cleaned with a lap pad. The uterine incision was closed with 1 Chromic suture in a running interlocking fashion. The  second layer was imbricated over the first layer  with 1 Chromic suture in a running non-locking  fashion. The posterior cul-de-sac was irrigated with warm normal saline. Uterus was replaced into the abdomen. Gutters were irrigated as well. The peritoneum was closed with  Chromic . The fascia was closed in a running non interlocking fashion using #0 PDS suture. The subcuticular layers were reapproximated with 2-0 plain gut in running fashion. The skin was closed with a 4-0 Vicryl in a subcuticular fashion. The patient tolerated the procedure well. Sponge, lap, and needle counts were correct times three and the patient and baby were taken to recovery/postpartum room in stable condition.       Signed By: Dexter Peres MD     March 8, 2023

## 2023-03-08 NOTE — LACTATION NOTE

## 2023-03-08 NOTE — L&D DELIVERY NOTE
Mother's Information      Labor Events     Labor?: No  Cervical Ripening:   Now               Daniel Strauss Lot [[id number]]      Labor Events     Labor?: No   Steroids?: None  Cervical Ripening Date/Time:     Antibiotics Received during Labor?: No  Rupture Date/Time: 3/8/23 07:39:46   Rupture Type: AROM  Fluid Color: Clear  Fluid Odor: None  Fluid Volume:  Moderate  Induction: None  Augmentation: None  Labor Complications: None       Anesthesia    Method: Spinal       Start Pushing      Labor onset date/time:   Now     Dilation complete date/time:   Now     Start pushing date/time:    Decision date/time (emergent ):           Labor Length    3rd stage: 0h 02m       Delivery ([place])      Delivery Date/Time:  3/8/23 07:40:00   Delivery Type: , Low Transverse    Details:  Trial of Labor?: No    Categorization: Repeat    Priority: Scheduled   Indications for : Prior Uterine Surgery, Macrosomia   Skin Incision Type: Pfannenstiel     Uterine Incision: Low Transverse             [place] Presentation    Presentation: Vertex       Shoulder Dystocia    Shoulder Dystocia Present?: No  Add Second Maneuver  Add Third Maneuver  Add Fourth Maneuver  Add Fifth Maneuver  Add Sixth Maneuver  Add Seventh Maneuver  Add Eighth Maneuver  Add Ninth Maneuver       Assisted Delivery Details    Forceps Attempted?: No  Vacuum Extractor Attempted?: No       Document Additional Attempt         Document Additional Attempt                 Cord    Vessels: 3 Vessels  Complications: Nuchal Loose  Cord Around: Head  Delayed Cord Clamping?: Yes  Cord Clamped Date/Time: 3/8/2023 07:41:00  Cord Blood Disposition: Lab  Gases Sent?: No       Placenta    Date/Time: 3/8/2023 07:42:00  Removal: Spontaneous  Appearance: Intact  Disposition: Discarded       Lacerations    Episiotomy: None       Vaginal Counts        Sponges Needles Instruments   Initial Counts      Final Counts      If the count is incorrect due to Intentionally Retained Foreign Object (IRFO) add the IRFO LDA in Lines/Drains. Add LDA: Link to [facility]       Blood Loss  Mother: Lenox Cogan #[id number]     Start of Mother's Information      Delivery Blood Loss  23 0713 - 23 0818      Quantitative Blood Loss (mL) Hospital Encounter 475 grams    Total  475 mL               End of Mother's Information  Mother: Lenox Cogan #[id number]                Delivery Providers    Delivering clinician: Connor Pickering MD     Provider Role    Connor Pickering MD Obstetrician    Tish Taylor, NEISHA Primary Nurse    Odalis Marmolejo, RN Primary [place] Nurse    Maicol Metcalf MD Neonatologist    Sagar Wade MD Anesthesiologist    Arlet Euceda, APRN - CRNA Nurse Anesthetist    Antoine Campbell RCP Respiratory Therapist    [facility] Medical Student    Rena Ross Nurse Anesthetist               Assessment    Living Status: Living     Apgar Scoring Key:    0 1 2    Skin Color: Blue or pale Acrocyanotic Completely pink    Heart Rate: Absent <100 bpm >100 bpm    Reflex Irritability: No response Grimace Cry or active withdrawal    Muscle Tone: Limp Some flexion Active motion    Respiratory Effort: Absent Weak cry; hypoventilation Good, crying                      Skin Color:   Heart Rate:   Reflex Irritability:   Muscle Tone:   Respiratory Effort:    Total:            1 Minute:    1    2    2    2    2    9        Apgar 1 total from OB History    5 Minute:    1    2    2    2    2    9        Apgar 5 total from OB History    10 Minute:              15 Minute:              20 Minute:                        Apgars Assigned By: DR. Hiren Hurt              Resuscitation    Method: Bulb Suction, Room Air              Measurements      Birth Weight: 3860 g Birth Length: 0.51 m     Head Circumference: 0.365 m Chest Circumference: 0.345 m              Title      Skin to Skin Initiation Date/Time:       Skin to Skin End Date/Time:

## 2023-03-08 NOTE — ANESTHESIA POSTPROCEDURE EVALUATION
Department of Anesthesiology  Postprocedure Note    Patient: Sadi Armas  MRN: [id number]  YOB: 1995  Date of evaluation: 3/8/2023      Procedure Summary     Date: 23 Room / Location: [facility] L&D OR  [facility] L&D    Anesthesia Start: 710 Anesthesia Stop: 827    Procedure:  SECTION (Abdomen) Diagnosis:       History of       (LGA in baby)    Surgeons: Conner Woodson MD Responsible Provider: Sinai Jackson MD    Anesthesia Type: spinal ASA Status: 2          Anesthesia Type: No value filed.     Yoana Phase I:      Yoana Phase II:        Anesthesia Post Evaluation    Patient location during evaluation: PACU  Patient participation: complete - patient participated  Level of consciousness: awake and alert  Pain score: 0  Airway patency: patent  Nausea & Vomiting: no nausea and no vomiting  Complications: no  Cardiovascular status: hemodynamically stable  Respiratory status: acceptable, nonlabored ventilation and spontaneous ventilation  Hydration status: euvolemic  Comments: BP (!) 102/58   Pulse 78   Temp 97.7 °F (36.5 °C) (Oral)   Resp 17   LMP 2022 (Exact Date)   SpO2 99%   Breastfeeding Unknown     Multimodal analgesia pain management approach

## 2023-03-08 NOTE — PROGRESS NOTES
Patient out of bed with stand by assistance, ambulated with steady gait, educated on tiff care and when to notify staff of bleeding/clots, instructed on measuring 2 voids, encouraged water, patient tolerated well

## 2023-03-08 NOTE — ANESTHESIA PRE PROCEDURE
Department of Anesthesiology  Preprocedure Note       Name:  Stevie Scherer   Age:  29 y.o.  :  1995                                          MRN:  [id number]         Date:  3/8/2023      Surgeon: Jessica Kent):  Tennille Heart MD    Procedure: Procedure(s):   SECTION    Medications prior to admission:   Prior to Admission medications    Medication Sig Start Date End Date Taking?  Authorizing Provider   vitamin C (ASCORBIC ACID) 500 MG tablet Take 1 tablet by mouth daily 23   Tennille Heart MD   ferrous sulfate (IRON 325) 325 (65 Fe) MG tablet Take 1 tablet by mouth 2 times daily 23   Tennille Heart MD   ondansetron (ZOFRAN-ODT) 8 MG TBDP disintegrating tablet  22   Historical Provider, MD   Prenatal MV-Min-Fe Fum-FA-DHA (PRENATAL MULTIVITAMIN + DHA PO) Take by mouth    Historical Provider, MD       Current medications:    Current Facility-Administered Medications   Medication Dose Route Frequency Provider Last Rate Last Admin    lidocaine 1 % injection 1 mL  1 mL IntraDERmal Once PRN Martha Cedillo MD        lactated ringers IV soln infusion   IntraVENous Continuous Martha Cedillo MD        sodium chloride flush 0.9 % injection 5-40 mL  5-40 mL IntraVENous 2 times per day Martha Cedillo MD        sodium chloride flush 0.9 % injection 5-40 mL  5-40 mL IntraVENous PRN Martha Cedillo MD        0.9 % sodium chloride infusion   IntraVENous PRN Martha Cedillo MD        albuterol (PROVENTIL) nebulizer solution 2.5 mg  2.5 mg Nebulization Q2H PRN Martha Cedillo MD        lactated ringers bolus  1,000 mL IntraVENous Once Tennille Heart MD        sodium chloride flush 0.9 % injection 10 mL  10 mL IntraVENous 2 times per day Tennille Heart MD        sodium chloride flush 0.9 % injection 10 mL  10 mL IntraVENous PRN Tennille Heart MD        0.9 % sodium chloride infusion   IntraVENous PRN Tennille Heart MD        gentamicin (GARAMYCIN) IVPB 120 mg 120 mg IntraVENous Once Fco Hernandez  mL/hr at 23 0633 120 mg at 23 3499    clindamycin (CLEOCIN) 900 mg in dextrose 5 % 50 mL IVPB  900 mg IntraVENous Once Fco Hernandez MD           Allergies: Allergies   Allergen Reactions    Cefprozil Hives       Problem List:    Patient Active Problem List   Diagnosis Code    Family history of breast cancer Z80.3    Family history of cancer Z80.9    Allergic rhinitis J30.9    History of delivery of macrosomal infant Z80.59    History of  delivery Z98.891    Anemia affecting pregnancy O99.019    Previous  section Z98.891       Past Medical History:        Diagnosis Date    Adverse effect of anesthesia     mom- has difficulty awakening, periods of apnea    Allergic rhinitis     COVID-19 vaccine series completed     Family history of cancer     breast, colon    GERD (gastroesophageal reflux disease)     Iron deficiency anemia 2017       Past Surgical History:        Procedure Laterality Date     SECTION  2017    CHOLECYSTECTOMY  2019    TONSILLECTOMY AND ADENOIDECTOMY         Social History:    Social History     Tobacco Use    Smoking status: Never    Smokeless tobacco: Never   Substance Use Topics    Alcohol use:  No                                Counseling given: Not Answered      Vital Signs (Current):   Vitals:    23 0545   BP: 121/73   Pulse: 95   Resp: 16   Temp: 98.9 °F (37.2 °C)   TempSrc: Oral   SpO2: 99%                                              BP Readings from Last 3 Encounters:   23 121/73   23 116/63   23 116/68       NPO Status: Time of last liquid consumption:                         Time of last solid consumption:                         Date of last liquid consumption: 23                        Date of last solid food consumption: 23    BMI:   Wt Readings from Last 3 Encounters:   23 161 lb (73 kg)   23 157 lb (71.2 kg)   02/14/23 156 lb (70.8 kg)     There is no height or weight on file to calculate BMI.    CBC:   Lab Results   Component Value Date/Time    WBC 6.8 03/08/2023 05:55 AM    RBC 3.96 03/08/2023 05:55 AM    HGB 10.6 03/08/2023 05:55 AM    HCT 32.4 03/08/2023 05:55 AM    MCV 81.8 03/08/2023 05:55 AM    RDW 12.7 03/08/2023 05:55 AM     03/08/2023 05:55 AM       CMP:   Lab Results   Component Value Date/Time     08/19/2020 08:47 AM    K 3.9 08/19/2020 08:47 AM     08/19/2020 08:47 AM    CO2 24 08/19/2020 08:47 AM    BUN 9 08/19/2020 08:47 AM    CREATININE 0.79 08/19/2020 08:47 AM    GFRAA 120 08/19/2020 08:47 AM    AGRATIO 1.9 08/19/2020 08:47 AM    GLUCOSE 81 08/19/2020 08:47 AM    PROT 6.6 08/19/2020 08:47 AM    CALCIUM 9.4 08/19/2020 08:47 AM    BILITOT 0.4 08/19/2020 08:47 AM    ALKPHOS 79 08/19/2020 08:47 AM    AST 13 08/19/2020 08:47 AM    ALT 10 08/19/2020 08:47 AM       POC Tests: No results for input(s): POCGLU, POCNA, POCK, POCCL, POCBUN, POCHEMO, POCHCT in the last 72 hours.     Coags: No results found for: PROTIME, INR, APTT    HCG (If Applicable): No results found for: PREGTESTUR, PREGSERUM, HCG, HCGQUANT     ABGs: No results found for: PHART, PO2ART, XSA6TXH, QWL9LGU, BEART, P9VUCYZV     Type & Screen (If Applicable):  No results found for: LABABO, LABRH    Drug/Infectious Status (If Applicable):  Lab Results   Component Value Date/Time    HEPCAB NONREACTIVE 08/18/2022 03:06 PM       COVID-19 Screening (If Applicable): No results found for: COVID19        Anesthesia Evaluation  Patient summary reviewed  Airway: Mallampati: II  TM distance: >3 FB   Neck ROM: full  Mouth opening: > = 3 FB   Dental: normal exam         Pulmonary:Negative Pulmonary ROS breath sounds clear to auscultation                             Cardiovascular:Negative CV ROS  Exercise tolerance: good (>4 METS),           Rhythm: regular  Rate: normal                    Neuro/Psych:   Negative Neuro/Psych ROS GI/Hepatic/Renal: Neg GI/Hepatic/Renal ROS  (+) GERD:,           Endo/Other:                      ROS comment:  presenting for scheduled repeat C/S    Anemia affecting pregnancy    DOS labs Hgb 10.6 and plt's 197 Abdominal:             Vascular: negative vascular ROS. Other Findings:           Anesthesia Plan      spinal     ASA 2           MIPS: Postoperative opioids intended and Prophylactic antiemetics administered. Anesthetic plan and risks discussed with patient and spouse. Plan discussed with CRNA.           Post-op pain plan if not by surgeon: intrathecal narcotics            Bertin Pham MD   3/8/2023

## 2023-03-08 NOTE — ANESTHESIA PROCEDURE NOTES
Spinal Block    Patient location during procedure: OR  End time: 3/8/2023 7:22 AM  Reason for block: primary anesthetic  Staffing  Performed: anesthesiologist   Anesthesiologist: Martha Cedillo MD  Spinal Block  Patient position: sitting  Prep: ChloraPrep  Patient monitoring: cardiac monitor, continuous pulse ox and frequent blood pressure checks  Approach: midline  Location: L3/L4  Provider prep: mask and sterile gloves  Local infiltration: lidocaine  Needle  Needle type: pencil-tip   Needle gauge: 25 G  Needle length: 3.5 in  Assessment  Events: SAB placement uncomplicated. No paresthesia. Swirl obtained: Yes  CSF: clear  Attempts: 1  Hemodynamics: stable  Additional Notes  Risks discussed including bleeding, infection, or damage to muscle/nerve. CSF aspirated before and after injection. No paresthesia. Patient tolerated well.   Preanesthetic Checklist  Completed: patient identified, IV checked, risks and benefits discussed, surgical/procedural consents, equipment checked, pre-op evaluation, timeout performed, anesthesia consent given, oxygen available and monitors applied/VS acknowledged

## 2023-03-08 NOTE — LACTATION NOTE
This note was copied from a baby's chart. Lactation visit. 2nd time mom,  first for a few weeks and then exclusively pumped x 2 months also. This baby only a few hours old, intermittent grunting and nasal flaring but stable. Has latched and fed well several times per mom and RN. Good latch per mom. Baby sleeping now, mom also asleep but woke up to talk with LC. Reviewed feeding expectations in first 24 hours of life. Watch for feeding cues. Attempt often. Offered help with latching or feeding observation today or tomorrow. No needs at present.

## 2023-03-09 ENCOUNTER — ANESTHESIA (OUTPATIENT)
Dept: MOTHER INFANT UNIT | Age: 28
End: 2023-03-09

## 2023-03-09 ENCOUNTER — ANESTHESIA EVENT (OUTPATIENT)
Dept: MOTHER INFANT UNIT | Age: 28
End: 2023-03-09

## 2023-03-09 LAB
HCT VFR BLD AUTO: 26.9 % (ref 35.8–46.3)
HGB BLD-MCNC: 8.8 G/DL (ref 11.7–15.4)

## 2023-03-09 PROCEDURE — 2580000003 HC RX 258: Performed by: OBSTETRICS & GYNECOLOGY

## 2023-03-09 PROCEDURE — 6370000000 HC RX 637 (ALT 250 FOR IP): Performed by: OBSTETRICS & GYNECOLOGY

## 2023-03-09 PROCEDURE — 1100000000 HC RM PRIVATE

## 2023-03-09 PROCEDURE — 85014 HEMATOCRIT: CPT

## 2023-03-09 PROCEDURE — 6360000002 HC RX W HCPCS: Performed by: ANESTHESIOLOGY

## 2023-03-09 PROCEDURE — 36415 COLL VENOUS BLD VENIPUNCTURE: CPT

## 2023-03-09 RX ORDER — MISOPROSTOL 200 UG/1
200 TABLET ORAL PRN
Status: DISCONTINUED | OUTPATIENT
Start: 2023-03-09 | End: 2023-03-10 | Stop reason: HOSPADM

## 2023-03-09 RX ORDER — METHYLERGONOVINE MALEATE 0.2 MG/ML
200 INJECTION INTRAVENOUS PRN
Status: DISCONTINUED | OUTPATIENT
Start: 2023-03-09 | End: 2023-03-10 | Stop reason: HOSPADM

## 2023-03-09 RX ORDER — ACETAMINOPHEN 500 MG
1000 TABLET ORAL EVERY 8 HOURS PRN
Status: DISCONTINUED | OUTPATIENT
Start: 2023-03-09 | End: 2023-03-10 | Stop reason: HOSPADM

## 2023-03-09 RX ORDER — OXYCODONE HYDROCHLORIDE 5 MG/1
5 TABLET ORAL EVERY 4 HOURS PRN
Status: DISCONTINUED | OUTPATIENT
Start: 2023-03-09 | End: 2023-03-10 | Stop reason: HOSPADM

## 2023-03-09 RX ORDER — DIPHENHYDRAMINE HYDROCHLORIDE 50 MG/ML
25 INJECTION INTRAMUSCULAR; INTRAVENOUS EVERY 6 HOURS PRN
Status: DISCONTINUED | OUTPATIENT
Start: 2023-03-09 | End: 2023-03-10 | Stop reason: HOSPADM

## 2023-03-09 RX ORDER — ONDANSETRON 8 MG/1
8 TABLET, ORALLY DISINTEGRATING ORAL EVERY 6 HOURS PRN
Status: DISCONTINUED | OUTPATIENT
Start: 2023-03-09 | End: 2023-03-10 | Stop reason: HOSPADM

## 2023-03-09 RX ORDER — OXYCODONE HYDROCHLORIDE 5 MG/1
10 TABLET ORAL EVERY 4 HOURS PRN
Status: DISCONTINUED | OUTPATIENT
Start: 2023-03-09 | End: 2023-03-10 | Stop reason: HOSPADM

## 2023-03-09 RX ORDER — FAMOTIDINE 20 MG/1
20 TABLET, FILM COATED ORAL 2 TIMES DAILY
Status: DISCONTINUED | OUTPATIENT
Start: 2023-03-09 | End: 2023-03-10 | Stop reason: HOSPADM

## 2023-03-09 RX ORDER — MORPHINE SULFATE 4 MG/ML
2 INJECTION, SOLUTION INTRAMUSCULAR; INTRAVENOUS
Status: DISCONTINUED | OUTPATIENT
Start: 2023-03-09 | End: 2023-03-10 | Stop reason: HOSPADM

## 2023-03-09 RX ORDER — DOCUSATE SODIUM 100 MG/1
100 CAPSULE, LIQUID FILLED ORAL 2 TIMES DAILY
Status: DISCONTINUED | OUTPATIENT
Start: 2023-03-09 | End: 2023-03-10 | Stop reason: HOSPADM

## 2023-03-09 RX ORDER — MORPHINE SULFATE 4 MG/ML
4 INJECTION, SOLUTION INTRAMUSCULAR; INTRAVENOUS
Status: DISCONTINUED | OUTPATIENT
Start: 2023-03-09 | End: 2023-03-10 | Stop reason: HOSPADM

## 2023-03-09 RX ORDER — SIMETHICONE 80 MG
80 TABLET,CHEWABLE ORAL EVERY 6 HOURS PRN
Status: DISCONTINUED | OUTPATIENT
Start: 2023-03-09 | End: 2023-03-10 | Stop reason: HOSPADM

## 2023-03-09 RX ORDER — IBUPROFEN 800 MG/1
800 TABLET ORAL EVERY 6 HOURS
Status: DISCONTINUED | OUTPATIENT
Start: 2023-03-09 | End: 2023-03-10 | Stop reason: HOSPADM

## 2023-03-09 RX ADMIN — SODIUM CHLORIDE, PRESERVATIVE FREE 10 ML: 5 INJECTION INTRAVENOUS at 03:36

## 2023-03-09 RX ADMIN — KETOROLAC TROMETHAMINE 30 MG: 30 INJECTION, SOLUTION INTRAMUSCULAR; INTRAVENOUS at 03:34

## 2023-03-09 RX ADMIN — IBUPROFEN 800 MG: 800 TABLET ORAL at 23:05

## 2023-03-09 RX ADMIN — IBUPROFEN 800 MG: 800 TABLET ORAL at 17:00

## 2023-03-09 RX ADMIN — SIMETHICONE 80 MG: 80 TABLET, CHEWABLE ORAL at 20:19

## 2023-03-09 RX ADMIN — IBUPROFEN 800 MG: 800 TABLET ORAL at 09:02

## 2023-03-09 RX ADMIN — FAMOTIDINE 20 MG: 20 TABLET, FILM COATED ORAL at 20:19

## 2023-03-09 RX ADMIN — DOCUSATE SODIUM 100 MG: 100 CAPSULE ORAL at 20:19

## 2023-03-09 RX ADMIN — ACETAMINOPHEN 1000 MG: 500 TABLET, FILM COATED ORAL at 14:43

## 2023-03-09 RX ADMIN — DOCUSATE SODIUM 100 MG: 100 CAPSULE ORAL at 09:03

## 2023-03-09 ASSESSMENT — PAIN DESCRIPTION - DESCRIPTORS: DESCRIPTORS: DISCOMFORT

## 2023-03-09 ASSESSMENT — PAIN SCALES - GENERAL: PAINLEVEL_OUTOF10: 5

## 2023-03-09 ASSESSMENT — PAIN DESCRIPTION - LOCATION: LOCATION: ABDOMEN;INCISION

## 2023-03-09 NOTE — CARE COORDINATION
Chart reviewed - no needs identified. SW met with patient to complete initial assessment. Patient denies any history of postpartum depression/anxiety. Patient given informational packet on  mood & anxiety disorders (resources/education). Family denies any additional needs from  at this time. Family has 's contact information should any needs/questions arise.     AALIYAH Philip, [address] [facility]   [phone]

## 2023-03-09 NOTE — PROGRESS NOTES
Post-Operative Day 1 Progress Note  Oj Varela  [id number]    Patient doing well post-op day 1 from  delivery without significant complaints. Pain controlled on current medication. Voiding without difficulty, normal lochia. Vitals:  Patient Vitals for the past 8 hrs:   BP Temp Temp src Pulse Resp SpO2   23 0720 108/67 98.4 °F (36.9 °C) Oral 82 16 98 %   23 0230 114/80 98.6 °F (37 °C) Oral 89 20 100 %     Temp (24hrs), Av.5 °F (36.9 °C), Min:97.9 °F (36.6 °C), Max:98.8 °F (37.1 °C)      No intake/output data recorded.  1901 -  0700  In: 2745 [P.O.:370; I.V.:2375]  Out: 7073 [Urine:2200]     Vital signs stable, afebrile. Exam:  Patient without distress. Abdomen soft, fundus firm at level of umbilicus, nontender. Incision dry and                      clean without erythema. Labs:   Recent Results (from the past 24 hour(s))   Hemoglobin and Hematocrit    Collection Time: 23  6:55 AM   Result Value Ref Range    Hemoglobin 8.8 (L) 11.7 - 15.4 g/dL    Hematocrit 26.9 (L) 35.8 - 46.3 %       Assessment and Plan:  Patient appears to be having uncomplicated post- course. Continue routine post-op care and maternal education.      Rub +  Hgb 10.6-->8.8

## 2023-03-09 NOTE — PROGRESS NOTES
Anesthesiology  Post-op Note    Post-op day 1 s/p  via spinal with neuraxial opioids for post-op pain management. /80   Pulse 89   Temp 98.6 °F (37 °C) (Oral)   Resp 20   LMP 2022 (Exact Date)   SpO2 100%   Breastfeeding Unknown   Airway patent, patient appropriately hydrated and appears euvolemic. Patient is Alert and oriented. Pain is well controlled. Pruritus is absent. Nausea is absent. No complaints about back or site of injection. Motor and sensory function has returned to baseline in lower extremities. Patient is satisfied with anesthetic and reports no complications. Continue current orders, then initiate surgeon's orders for pain management 24 hours after . Follow up per surgeon. Patient denies any needs at this time.

## 2023-03-10 VITALS
OXYGEN SATURATION: 97 % | HEART RATE: 92 BPM | DIASTOLIC BLOOD PRESSURE: 80 MMHG | SYSTOLIC BLOOD PRESSURE: 114 MMHG | TEMPERATURE: 98.4 F | RESPIRATION RATE: 17 BRPM

## 2023-03-10 PROCEDURE — 6370000000 HC RX 637 (ALT 250 FOR IP): Performed by: OBSTETRICS & GYNECOLOGY

## 2023-03-10 RX ORDER — IBUPROFEN 800 MG/1
800 TABLET ORAL EVERY 8 HOURS PRN
Qty: 90 TABLET | Refills: 0 | Status: SHIPPED | OUTPATIENT
Start: 2023-03-10

## 2023-03-10 RX ORDER — OXYCODONE HYDROCHLORIDE 5 MG/1
5 TABLET ORAL EVERY 8 HOURS PRN
Qty: 6 TABLET | Refills: 0 | Status: SHIPPED | OUTPATIENT
Start: 2023-03-10 | End: 2023-03-17

## 2023-03-10 RX ADMIN — ACETAMINOPHEN 1000 MG: 500 TABLET, FILM COATED ORAL at 03:40

## 2023-03-10 RX ADMIN — IBUPROFEN 800 MG: 800 TABLET ORAL at 05:47

## 2023-03-10 NOTE — DISCHARGE INSTRUCTIONS
Section: What to Expect at [address] [place] [address]     A  section, or , is surgery to deliver your baby through a cut that the doctor makes in your lower belly and uterus. The cut is called an incision. You may have some pain in your lower belly and need pain medicine for 1 to 2 weeks. You can expect some vaginal bleeding for several weeks. You will probably need about 6 weeks to fully recover. It's important to take it easy while the incision heals. Avoid heavy lifting, strenuous activities, and exercises that strain the belly muscles while you recover. Ask a family member or friend for help with housework, cooking, and shopping. This care sheet gives you a general idea about how long it will take for you to recover. But each person recovers at a different pace. Follow the steps below to get better as quickly as possible. How can you care for yourself at home? Activity    Rest when you feel tired. Getting enough sleep will help you recover. Try to walk each day. Start by walking a little more than you did the day before. Bit by bit, increase the amount you walk. Walking boosts blood flow and helps prevent pneumonia, constipation, and blood clots. Avoid strenuous activities, such as bicycle riding, jogging, weightlifting, and aerobic exercise, for 6 weeks or until your doctor says it is okay. Until your doctor says it is okay, do not lift anything heavier than your baby. Do not do sit-ups or other exercises that strain the belly muscles for 6 weeks or until your doctor says it is okay. Hold a pillow over your incision when you cough or take deep breaths. This will support your belly and decrease your pain. You may shower as usual. Pat the incision dry when you are done. You will have some vaginal bleeding. Wear sanitary pads. Do not douche or use tampons until your doctor says it is okay. Ask your doctor when you can drive again.      You will probably need to take at least 6 weeks off work. It depends on the type of work you do and how you feel. Ask your doctor when it is okay for you to have sex. Diet    You can eat your normal diet. If your stomach is upset, try bland, low-fat foods like plain rice, broiled chicken, toast, and yogurt. Drink plenty of fluids (unless your doctor tells you not to). You may notice that your bowel movements are not regular right after your surgery. This is common. Try to avoid constipation and straining with bowel movements. You may want to take a fiber supplement every day. If you have not had a bowel movement after a couple of days, ask your doctor about taking a mild laxative. If you are breastfeeding, limit alcohol. Alcohol can cause a lack of energy and other health problems for the baby when a breastfeeding woman drinks heavily. It can also get in the way of a mom's ability to feed her baby or to care for the child in other ways. There isn't a lot of research about exactly how much alcohol can harm a baby. Having no alcohol is the safest choice for your baby. If you choose to have a drink now and then, have only one drink, and limit the number of occasions that you have a drink. Wait to breastfeed at least 2 hours after you have a drink to reduce the amount of alcohol the baby may get in the milk. Medicines    Your doctor will tell you if and when you can restart your medicines. You will also get instructions about taking any new medicines. If you stopped taking aspirin or some other blood thinner, your doctor will tell you when to start taking it again. Take pain medicines exactly as directed. If the doctor gave you a prescription medicine for pain, take it as prescribed. If you are not taking a prescription pain medicine, ask your doctor if you can take an over-the-counter medicine. If you think your pain medicine is making you sick to your stomach:   Take your medicine after meals (unless your doctor has told you not to). Ask your doctor for a different pain medicine. If your doctor prescribed antibiotics, take them as directed. Do not stop taking them just because you feel better. You need to take the full course of antibiotics. Incision care    If you have strips of tape on the incision, leave the tape on for a week or until it falls off. Wash the area daily with warm, soapy water, and pat it dry. Don't use hydrogen peroxide or alcohol, which can slow healing. You may cover the area with a gauze bandage if it weeps or rubs against clothing. Change the bandage every day. Keep the area clean and dry. Other instructions    If you breastfeed your baby, you may be more comfortable while you are healing if you don't rest your baby on your belly. Try tucking your baby under your arm, with your baby's body along the side you will be feeding on. Support your baby's upper body with your arm. With that hand you can control your baby's head to bring your baby's mouth to your breast. This is sometimes called the football hold. Follow-up care is a key part of your treatment and safety. Be sure to make and go to all appointments, and call your doctor if you are having problems. It's also a good idea to know your test results and keep a list of the medicines you take. When should you call for help? Share this information with your partner, family, or a friend. They can help you watch for warning signs. Call 911  anytime you think you may need emergency care. For example, call if:    You have thoughts of harming yourself, your baby, or another person. You passed out (lost consciousness). You have chest pain, are short of breath, or cough up blood. You have a seizure. Call your doctor now or seek immediate medical care if:    You have loose stitches, or your incision comes open. You have signs of hemorrhage (too much bleeding), such as:  Heavy vaginal bleeding.  This means that you are soaking through one or more pads in an hour. Or you pass blood clots bigger than an egg. Feeling dizzy or lightheaded, or you feel like you may faint. Feeling so tired or weak that you cannot do your usual activities. A fast or irregular heartbeat. New or worse belly pain. You have symptoms of infection, such as: Increased pain, swelling, warmth, or redness. Red streaks leading from the incision. Pus draining from the incision. A fever. Vaginal discharge that smells bad. New or worse belly pain. You have symptoms of a blood clot in your leg (called a deep vein thrombosis), such as:  Pain in your calf, back of the knee, thigh, or groin. Redness and swelling in your leg or groin. You have signs of preeclampsia, such as:  Sudden swelling of your face, hands, or feet. New vision problems (such as dimness, blurring, or seeing spots). A severe headache. Watch closely for changes in your health, and be sure to contact your doctor if:    Your vaginal bleeding isn't decreasing. You feel sad, anxious, or hopeless for more than a few days. You are having problems with your breasts or breastfeeding. Where can you learn more? Go to http://www.woods.com/ and enter M806 to learn more about \" Section: What to Expect at Home. \"  Current as of: 2022               Content Version: 13.5   [employer]. Care instructions adapted under license by [facility] ([facility]). If you have questions about a medical condition or this instruction, always ask your healthcare professional. [place] [address] any warranty or liability for your use of this information.

## 2023-03-10 NOTE — PROGRESS NOTES
Patient discharged to home per MD orders.  Discharge instructions reviewed with patient. Questions encouraged and answered. Patient verbalizes understanding. Patient escorted by [facility] staff to private vehicle. Stable at discharge.

## 2023-03-10 NOTE — LACTATION NOTE
This note was copied from a baby's chart. In to follow up with mom and infant. Mom stated that she had been concerned earlier because infant was sleeping and wouldn't wake up to nurse. Mom stated that he finally woke up and is latching and nursing well again. Reviewed the second night. Mom stated that she had no other concerns. Lactation consultant will follow up tomorrow.

## 2023-03-10 NOTE — DISCHARGE SUMMARY
Discharge Summary:     Date of Admission:  3/8/2023  5:22 AM  Date of Discharge:  3/10/2023      Patient is a 29 y.o.  at 39w1d wks who was admitted for repeat c/s. The indication for  was repeat. A Low Transverse  was performed. On post-op day #1, the patient had her catheter removed and was ambulating well in the arellano. Her post operative hemoglobin was stable. Patient had a normal post operative course. Incision stayed clean and dry, without erythema. Patient remained afebrile throughout the entire hospital stay. On day of discharge, she was discharged home in good condition with routine post  instructions. She was breast feeding the infant and plans on undecided for birth control. Pt was scheduled to follow up in two weeks for an incision check. HTN diagnosis: no     Discharge Meds:       Medication List        START taking these medications      ibuprofen 800 MG tablet  Commonly known as: ADVIL;MOTRIN  Take 1 tablet by mouth every 8 hours as needed for Pain     oxyCODONE 5 MG immediate release tablet  Commonly known as: ROXICODONE  Take 1 tablet by mouth every 8 hours as needed for Pain for up to 7 days. Max Daily Amount: 15 mg            CONTINUE taking these medications      PRENATAL MULTIVITAMIN + DHA PO            STOP taking these medications      ferrous sulfate 325 (65 Fe) MG tablet  Commonly known as: IRON 325     ondansetron 8 MG Tbdp disintegrating tablet  Commonly known as: ZOFRAN-ODT     vitamin C 500 MG tablet  Commonly known as: ASCORBIC ACID               Where to Get Your Medications        These medications were sent to [facility], [address] [place] [address]      Phone: [phone]   ibuprofen 800 MG tablet  oxyCODONE 5 MG immediate release tablet         No problem-specific Assessment & Plan notes found for this encounter.        Hina Sahu MD  8:21 AM  03/10/23

## 2023-03-10 NOTE — PROGRESS NOTES
Post-Operative Day Number 2 Progress Note  Tim Acosta  [id number]    Patient doing well post-op day 2 from  delivery without significant complaints. Pain controlled on current medication. Voiding without difficulty, normal lochia. Tolerating regular diet    Vitals:  Patient Vitals for the past 24 hrs:   BP Temp Temp src Pulse Resp SpO2   23 2304 100/69 98.2 °F (36.8 °C) Oral 91 18 96 %   23 1518 111/76 98.5 °F (36.9 °C) Oral 93 17 97 %   23 1102 117/84 98.2 °F (36.8 °C) Oral 92 16 97 %     Temp (24hrs), Av.3 °F (36.8 °C), Min:98.2 °F (36.8 °C), Max:98.5 °F (36.9 °C)      Vital signs stable, afebrile. Exam:  Patient without distress. Abdomen soft, fundus firm at level of umbilicus, nontender. Incision dry and                      clean without erythema. Labs: No results found for this or any previous visit (from the past 24 hour(s)). Assessment and Plan:  Patient appears to be having uncomplicated post- course. Continue routine post-op care and maternal education. Wants to go home today.

## 2023-03-27 NOTE — PROGRESS NOTES
CC:  2wk C/S postop follow-up    HPI:  Jannie Durán presents for postop visit from repeat LTCS about 2 weeks ago w/ me on 3/8/23 due to hx of c/s x1, LGA baby, declining TOLAC. Prenatal course complicated by anemia, history macrosomia in prior pregnancy as well. Patient doing well postpartum without significant complaints. Voiding without difficulty. Pain controlled on no medications at this point other than PRN Ibuprofen. Light spotting only. +BM/Flatus. Denies n/v, tolerating regular diet. Pt Dc'd home on POD#2      Strictly pumping -- denies any breast complaints (erythema, lesions, rashes, abscess, etc). Denies any si/sx pp depression. Patient Active Problem List    Diagnosis Date Noted    Previous  section 2023    39 weeks gestation of pregnancy 2023    Macrosomia 2023    Anemia affecting pregnancy 2023    History of delivery of macrosomal infant 2022    History of  delivery 2022    Family history of cancer     Family history of breast cancer 2019    Allergic rhinitis           Physical Exam:   /73   Ht 5' 2\" (1.575 m)   Wt 138 lb (62.6 kg)   LMP 2022 (Exact Date)   Breastfeeding Yes   BMI 25.24 kg/m²   Constitutional: She appears well-developed and well-nourished. No distress. HENT:    Head: Normocephalic and atraumatic. Lungs: CTAB, effort normal  Cardiovascular: RRR, no M/R/G  Abd:  Fundus firm and well below umbilicus, S/NTTP/ND, BS normoactive. Incision c/d/i w/out erythema/induration   Skin: She is not diaphoretic. Psychiatric: She has a normal mood and affect. Her behavior is normal. Thought content normal.         Contraception:   planning for vasectomy       A/P:  Stable Post op condition. Gradually increase activity; continue pelvic rest and keep lifting to <15lbs until 6wks pp.   Follow up  4 weeks for routine 6wk pp apt           Saint Iha, MD

## 2023-03-28 ENCOUNTER — POSTPARTUM VISIT (OUTPATIENT)
Dept: OBGYN CLINIC | Age: 28
End: 2023-03-28

## 2023-03-28 VITALS
SYSTOLIC BLOOD PRESSURE: 123 MMHG | DIASTOLIC BLOOD PRESSURE: 73 MMHG | HEIGHT: 62 IN | BODY MASS INDEX: 25.4 KG/M2 | WEIGHT: 138 LBS

## 2023-03-28 DIAGNOSIS — Z09 POSTOPERATIVE FOLLOW-UP: Primary | ICD-10-CM

## 2023-03-28 DIAGNOSIS — Z30.09 ENCOUNTER FOR COUNSELING REGARDING CONTRACEPTION: ICD-10-CM

## 2023-03-28 PROCEDURE — 99024 POSTOP FOLLOW-UP VISIT: CPT | Performed by: OBSTETRICS & GYNECOLOGY

## 2023-04-24 ENCOUNTER — POSTPARTUM VISIT (OUTPATIENT)
Dept: OBGYN CLINIC | Age: 28
End: 2023-04-24

## 2023-04-24 VITALS
BODY MASS INDEX: 25.4 KG/M2 | DIASTOLIC BLOOD PRESSURE: 72 MMHG | HEIGHT: 62 IN | SYSTOLIC BLOOD PRESSURE: 116 MMHG | WEIGHT: 138 LBS

## 2023-04-24 DIAGNOSIS — Z30.09 ENCOUNTER FOR COUNSELING REGARDING CONTRACEPTION: ICD-10-CM

## 2023-04-24 DIAGNOSIS — Z09 POSTOPERATIVE FOLLOW-UP: ICD-10-CM

## 2023-04-24 PROCEDURE — 99902 PR PRENATAL VISIT: CPT | Performed by: OBSTETRICS & GYNECOLOGY

## 2023-04-24 NOTE — PROGRESS NOTES
CC:  6wk pp visit/C/S postop follow-up    HPI:  Jorge Mazariegos presents for postop visit from repeat LTCS about 6 weeks ago w/ me on 3/8/23 due to hx of c/s x1, LGA baby, declining TOLAC. Prenatal course complicated by anemia, history macrosomia in prior pregnancy as well. Patient doing well postpartum without significant complaints. Voiding without difficulty. Pain controlled on no medications at this point other than PRN Ibuprofen. Light spotting only. +BM/Flatus. Denies n/v, tolerating regular diet. Strictly pumping and breastfeeding -- denies any breast complaints (erythema, lesions, rashes, abscess, etc). Denies any si/sx pp depression. Patient Active Problem List    Diagnosis Date Noted    Previous  section 2023    39 weeks gestation of pregnancy 2023    Macrosomia 2023    Anemia affecting pregnancy 2023    History of delivery of macrosomal infant 2022    History of  delivery 2022    Family history of cancer     Family history of breast cancer 2019    Allergic rhinitis           Physical Exam:   /72   Ht 5' 2\" (1.575 m)   Wt 138 lb (62.6 kg)   Breastfeeding Yes   BMI 25.24 kg/m²   Constitutional: She appears well-developed and well-nourished. No distress. HENT:    Head: Normocephalic and atraumatic. Lungs: CTAB, effort normal  Cardiovascular: RRR, no M/R/G  Abd:  Fundus firm and well below umbilicus, S/NTTP/ND, BS normoactive. Incision c/d/i w/out erythema/induration   Skin: She is not diaphoretic. Psychiatric: She has a normal mood and affect. Her behavior is normal. Thought content normal.         Contraception:   planning for vasectomy       A/P:  Stable Post op condition.     Cleared for all  Condoms until s/p vasectomy clearance     FU for AEs in October         Kehinde Archuleta MD

## 2024-08-09 ENCOUNTER — OFFICE VISIT (OUTPATIENT)
Dept: OBGYN CLINIC | Age: 29
End: 2024-08-09
Payer: COMMERCIAL

## 2024-08-09 VITALS
SYSTOLIC BLOOD PRESSURE: 120 MMHG | HEIGHT: 62 IN | BODY MASS INDEX: 28.52 KG/M2 | WEIGHT: 155 LBS | DIASTOLIC BLOOD PRESSURE: 82 MMHG

## 2024-08-09 DIAGNOSIS — Z01.419 WELL WOMAN EXAM WITH ROUTINE GYNECOLOGICAL EXAM: Primary | ICD-10-CM

## 2024-08-09 DIAGNOSIS — Z12.4 SCREENING FOR CERVICAL CANCER: ICD-10-CM

## 2024-08-09 PROCEDURE — 99395 PREV VISIT EST AGE 18-39: CPT | Performed by: OBSTETRICS & GYNECOLOGY

## 2024-08-09 NOTE — PROGRESS NOTES
portion of bimanual exam reveals contour normal, shape regular, descensus absent, no nodularity, no tenderness and size 6 weeks GA.    Adnexa and parametria exam reveals no masses, nontender.    Visual examination of anus and perineum shows no abnormalities.  Musculoskeletal: Normal range of motion.         General: No tenderness.   Lymphadenopathy:      Cervical: No cervical adenopathy.      Upper Body:      Right upper body: No supraclavicular adenopathy.      Left upper body: No supraclavicular adenopathy.      Lower Body: No right inguinal adenopathy. No left inguinal adenopathy.   Skin:     General: Skin is warm and dry.      Coloration: Skin is not pale.      Findings: No erythema or rash.   Neurological:      Mental Status: She is alert and oriented to person, place, and time.      Cranial Nerves: No cranial nerve deficit.      Motor: No abnormal muscle tone.      Coordination: Coordination normal.   Psychiatric:         Behavior: Behavior normal.         Thought Content: Thought content normal.         Judgment: Judgment normal.         1. Well woman exam with routine gynecological exam    2. Screening for cervical cancer      Orders Placed This Encounter   Procedures    PAP LB, Reflex HPV ASCUS     No orders of the defined types were placed in this encounter.    Routine age-appropriate well woman counseling was performed.  Pap guidelines reviewed, individual risk factors and individual preferences. Pap collected today.  Heavy periods discussed. Can have US if worsening, declines Tx at this time.     Return in about 1 year (around 8/9/2025), or if symptoms worsen or fail to improve, for Annual exam.

## 2024-08-12 LAB
COLLECTION METHOD: NORMAL
CYTOLOGIST CVX/VAG CYTO: NORMAL
CYTOLOGY CVX/VAG DOC THIN PREP: NORMAL
HPV REFLEX: NORMAL
Lab: NORMAL
PAP SOURCE: NORMAL
PATH REPORT.FINAL DX SPEC: NORMAL
STAT OF ADQ CVX/VAG CYTO-IMP: NORMAL

## 2025-05-05 ENCOUNTER — PROCEDURE VISIT (OUTPATIENT)
Dept: OBGYN CLINIC | Age: 30
End: 2025-05-05
Payer: COMMERCIAL

## 2025-05-05 ENCOUNTER — OFFICE VISIT (OUTPATIENT)
Dept: OBGYN CLINIC | Age: 30
End: 2025-05-05
Payer: COMMERCIAL

## 2025-05-05 VITALS
DIASTOLIC BLOOD PRESSURE: 70 MMHG | SYSTOLIC BLOOD PRESSURE: 138 MMHG | HEIGHT: 62 IN | WEIGHT: 160 LBS | BODY MASS INDEX: 29.44 KG/M2

## 2025-05-05 DIAGNOSIS — Z98.891 HISTORY OF CESAREAN DELIVERY: ICD-10-CM

## 2025-05-05 DIAGNOSIS — N94.6 DYSMENORRHEA: ICD-10-CM

## 2025-05-05 DIAGNOSIS — N85.4 RETROVERTED UTERUS: ICD-10-CM

## 2025-05-05 DIAGNOSIS — N93.9 ABNORMAL UTERINE BLEEDING (AUB): Primary | ICD-10-CM

## 2025-05-05 DIAGNOSIS — N92.0 MENORRHAGIA WITH REGULAR CYCLE: ICD-10-CM

## 2025-05-05 DIAGNOSIS — R10.2 PELVIC PAIN: ICD-10-CM

## 2025-05-05 DIAGNOSIS — N92.1 MENORRHAGIA WITH IRREGULAR CYCLE: Primary | ICD-10-CM

## 2025-05-05 PROCEDURE — 76830 TRANSVAGINAL US NON-OB: CPT | Performed by: OBSTETRICS & GYNECOLOGY

## 2025-05-05 PROCEDURE — 99214 OFFICE O/P EST MOD 30 MIN: CPT | Performed by: OBSTETRICS & GYNECOLOGY

## 2025-05-05 RX ORDER — UBIDECARENONE 75 MG
50 CAPSULE ORAL DAILY
COMMUNITY

## 2025-05-05 SDOH — ECONOMIC STABILITY: FOOD INSECURITY: WITHIN THE PAST 12 MONTHS, YOU WORRIED THAT YOUR FOOD WOULD RUN OUT BEFORE YOU GOT MONEY TO BUY MORE.: NEVER TRUE

## 2025-05-05 SDOH — ECONOMIC STABILITY: FOOD INSECURITY: WITHIN THE PAST 12 MONTHS, THE FOOD YOU BOUGHT JUST DIDN'T LAST AND YOU DIDN'T HAVE MONEY TO GET MORE.: NEVER TRUE

## 2025-05-20 NOTE — PROGRESS NOTES
Name: Mehreen Cassidy     : 1995    Physicians Information    Recommended Surgery: OTONIEL MCCLELLAN, cysto  Place: [facility]   When: TBD  Diagnosis:   Diagnosis Orders   1. Abnormal uterine bleeding (AUB)        2. Menorrhagia with regular cycle        3. History of  delivery        4. Dysmenorrhea        5. Pelvic pain        6. Retroverted uterus          Time Needed:  Surgeon:Zaida Oakley MD  Assistant Needed: Edie Navas CST  Special Request:    Surgery Department Information    Date Scheduled: _____________________ Hospital Pre-Op:______________________        Time Scheduled : ____________________ Surgery Entered: _____________________    Facility: ____________________________ Patient Notified: ______________________    Pre-Op: _______________________ Orders Completed: ___________________    
Grandfather     Kidney Disease Maternal Grandfather     Heart Disease Maternal Grandfather     Diabetes Paternal Grandmother     Cancer Paternal Grandmother         lung    Heart Disease Paternal Grandmother     COPD Paternal Grandfather     Heart Disease Paternal Grandfather     Cancer Paternal Grandfather         lung and bone    Breast Cancer Maternal Aunt     Colon Cancer Paternal Aunt 42    Breast Cancer Other     Ovarian Cancer Neg Hx      /70   Ht 1.575 m (5' 2\")   Wt 72.6 kg (160 lb)   LMP 04/26/2025   BMI 29.26 kg/m²      ROS:  Constitutional: Negative.    HENT: Negative.     Eyes: Negative.    Respiratory: Negative.     Cardiovascular: Negative.    Gastrointestinal: Negative.    Endocrine: Negative.    Genitourinary: Negative.    Musculoskeletal: Negative.    Allergic/Immunologic: Negative.    Neurological: Negative.    Hematological: Negative.    Psychiatric/Behavioral: Negative.         Physical Exam  Vitals reviewed.   Constitutional:       General: She is not in acute distress.     Appearance: Normal appearance. She is well-developed. She is not ill-appearing, toxic-appearing or diaphoretic.   HENT:      Head: Normocephalic and atraumatic.   Eyes:      General: No scleral icterus.     Conjunctiva/sclera: Conjunctivae normal.      Pupils: Pupils are equal, round, and reactive to light.   Pulmonary:      Effort: Pulmonary effort is normal. No respiratory distress.   Abdominal:      General: There is no distension.   Musculoskeletal:         General: Normal range of motion.      Cervical back: Normal range of motion and neck supple.   Skin:     General: Skin is warm and dry.      Coloration: Skin is not jaundiced or pale.      Findings: No erythema or rash.   Neurological:      General: No focal deficit present.      Mental Status: She is alert, oriented to person, place, and time and easily aroused.      Motor: No abnormal muscle tone.      Coordination: Coordination normal.   Psychiatric:

## 2025-05-27 ENCOUNTER — PREP FOR PROCEDURE (OUTPATIENT)
Dept: OBGYN CLINIC | Age: 30
End: 2025-05-27

## 2025-05-27 DIAGNOSIS — R10.2 PELVIC PAIN IN FEMALE: ICD-10-CM

## 2025-05-27 DIAGNOSIS — N94.6 DYSMENORRHEA: ICD-10-CM

## 2025-05-27 DIAGNOSIS — N85.4 RETROVERTED UTERUS: ICD-10-CM

## 2025-05-27 DIAGNOSIS — N93.9 ABNORMAL UTERINE BLEEDING (AUB): ICD-10-CM

## 2025-05-27 PROBLEM — N92.0 MENORRHAGIA: Status: ACTIVE | Noted: 2025-05-27

## (undated) DEVICE — SUT CHRMC 0 27IN CT1 BRN --

## (undated) DEVICE — SYR LR LCK 1ML GRAD NSAF 30ML --

## (undated) DEVICE — BAG SPEC RETRV 275ML 10ML DISPOSABLE RELIACATCH

## (undated) DEVICE — REM POLYHESIVE ADULT PATIENT RETURN ELECTRODE: Brand: VALLEYLAB

## (undated) DEVICE — SUTURE CHROMIC GUT SZ 1 L27IN ABSRB BRN L36MM CT-1 1/2 CIR 813H

## (undated) DEVICE — CATH FOL TY IC BAG 16FR 2000ML -- CONVERT TO ITEM 363158

## (undated) DEVICE — SOLUTION IRRIG 1000ML H2O STRL BLT

## (undated) DEVICE — SUTURE VCRL SZ 1 L27IN ABSRB UD CT-1 L36MM 1/2 CIR J261H

## (undated) DEVICE — NEEDLE HYPO 21GA L1.5IN INTRAMUSCULAR S STL LATCH BVL UP

## (undated) DEVICE — PENCIL ES L3M BTTN SWCH S STL HEX LOK BLDE ELECTRD HOLSTER

## (undated) DEVICE — KENDALL SCD EXPRESS SLEEVES, KNEE LENGTH, MEDIUM: Brand: KENDALL SCD

## (undated) DEVICE — [HIGH FLOW INSUFFLATOR,  DO NOT USE IF PACKAGE IS DAMAGED,  KEEP DRY,  KEEP AWAY FROM SUNLIGHT,  PROTECT FROM HEAT AND RADIOACTIVE SOURCES.]: Brand: PNEUMOSURE

## (undated) DEVICE — SUTURE CHROMIC GUT SZ 0 L27IN ABSRB BRN L36MM CT-1 1/2 CIR 812H

## (undated) DEVICE — INSUFFLATION NEEDLE: Brand: SURGINEEDLE

## (undated) DEVICE — CLIP APPLIER WITH CLIP LOGIC TECHNOLOGY: Brand: ENDO CLIP III

## (undated) DEVICE — PREMIUM WET SKIN PREP TRAY: Brand: MEDLINE INDUSTRIES, INC.

## (undated) DEVICE — KIT URIN CATH L16FR BLLN 5ML INDWL STR TIP INF CTRL URIN

## (undated) DEVICE — UNIVERSAL FIXATION CANNULA: Brand: VERSAONE

## (undated) DEVICE — MEDI-VAC NON-CONDUCTIVE SUCTION TUBING: Brand: CARDINAL HEALTH

## (undated) DEVICE — (D)STRIP SKN CLSR 0.5X4IN WHT --

## (undated) DEVICE — INTENDED FOR TISSUE SEPARATION, AND OTHER PROCEDURES THAT REQUIRE A SHARP SURGICAL BLADE TO PUNCTURE OR CUT.: Brand: BARD-PARKER SAFETY BLADES SIZE 15, STERILE

## (undated) DEVICE — SUTURE PLN GUT SZ 2-0 L27IN ABSRB YELLOWISH TAN L40MM CT 853H

## (undated) DEVICE — SUTURE MCRYL SZ 4-0 L27IN ABSRB UD L19MM PS-2 1/2 CIR PRIM Y426H

## (undated) DEVICE — SUTURE MCRYL 3-0 L27IN ABSRB VLT SH L26MM 1/2 CIR Y316H

## (undated) DEVICE — ELECTRODE PT RET AD L9FT HI MOIST COND ADH HYDRGEL CORDED

## (undated) DEVICE — SURGICAL PROCEDURE PACK C SECT CDS

## (undated) DEVICE — SOLUTION IV 1000ML 0.9% SOD CHL

## (undated) DEVICE — APPLICATOR MEDICATED 26 CC SOLUTION HI LT ORNG CHLORAPREP

## (undated) DEVICE — MASTISOL ADHESIVE LIQ 2/3ML

## (undated) DEVICE — INTENDED FOR TISSUE SEPARATION, AND OTHER PROCEDURES THAT REQUIRE A SHARP SURGICAL BLADE TO PUNCTURE OR CUT.: Brand: BARD-PARKER SAFETY BLADES SIZE 11, STERILE

## (undated) DEVICE — STERILE POLYISOPRENE POWDER-FREE SURGICAL GLOVES: Brand: PROTEXIS

## (undated) DEVICE — SOL ANTI-FOG 6ML MEDC -- MEDICHOICE - CONVERT TO 358427

## (undated) DEVICE — SUTURE PDS II SZ 0 L27IN ABSRB VLT L36MM CT-1 1/2 CIR Z340H

## (undated) DEVICE — SUTURE MCRYL SZ 2-0 L36IN ABSRB UD L36MM CT-1 1/2 CIR Y945H

## (undated) DEVICE — SUTURE VCRL SZ 0 L27IN ABSRB UD L26MM CT-2 1/2 CIR J270H

## (undated) DEVICE — SUTURE ABSRB X-1 REV CUT 1/2 CIR 22MM UD BRAID 27IN SZ 3-0 J458H

## (undated) DEVICE — SUTURE MCRYL SZ 0 L36IN ABSRB UD L36MM CT-1 1/2 CIR Y946H

## (undated) DEVICE — BLUNT TROCAR WITH THREADED ANCHOR: Brand: VERSAONE

## (undated) DEVICE — TUBING, SUCTION, 1/4" X 10', STRAIGHT: Brand: MEDLINE

## (undated) DEVICE — BLADELESS OPTICAL TROCAR WITH FIXATION CANNULA: Brand: VERSAPORT

## (undated) DEVICE — LAP CHOLE: Brand: MEDLINE INDUSTRIES, INC.